# Patient Record
Sex: MALE | Race: WHITE | NOT HISPANIC OR LATINO | Employment: OTHER | URBAN - METROPOLITAN AREA
[De-identification: names, ages, dates, MRNs, and addresses within clinical notes are randomized per-mention and may not be internally consistent; named-entity substitution may affect disease eponyms.]

---

## 2020-08-17 ENCOUNTER — HOSPITAL ENCOUNTER (OUTPATIENT)
Dept: PREADMISSION TESTING | Facility: HOSPITAL | Age: 65
Discharge: HOME | End: 2020-08-17
Attending: COLON & RECTAL SURGERY
Payer: COMMERCIAL

## 2020-08-17 PROCEDURE — U0003 INFECTIOUS AGENT DETECTION BY NUCLEIC ACID (DNA OR RNA); SEVERE ACUTE RESPIRATORY SYNDROME CORONAVIRUS 2 (SARS-COV-2) (CORONAVIRUS DISEASE [COVID-19]), AMPLIFIED PROBE TECHNIQUE, MAKING USE OF HIGH THROUGHPUT TECHNOLOGIES AS DESCRIBED BY CMS-2020-01-R: HCPCS

## 2020-08-17 RX ORDER — TACROLIMUS 1 MG/1
3 CAPSULE ORAL EVERY 12 HOURS
COMMUNITY

## 2020-08-17 RX ORDER — METOPROLOL TARTRATE 50 MG/1
50 TABLET ORAL DAILY
COMMUNITY

## 2020-08-17 RX ORDER — OMEPRAZOLE 20 MG/1
20 CAPSULE, DELAYED RELEASE ORAL DAILY
COMMUNITY

## 2020-08-17 RX ORDER — ALOGLIPTIN 12.5 MG/1
1 TABLET, FILM COATED ORAL DAILY
COMMUNITY

## 2020-08-17 RX ORDER — TAMSULOSIN HYDROCHLORIDE 0.4 MG/1
0.4 CAPSULE ORAL NIGHTLY
COMMUNITY

## 2020-08-17 RX ORDER — INSULIN GLARGINE 100 [IU]/ML
35 INJECTION, SOLUTION SUBCUTANEOUS NIGHTLY
COMMUNITY

## 2020-08-17 RX ORDER — TRIAMCINOLONE ACETONIDE 1 MG/ML
LOTION TOPICAL 2 TIMES DAILY
COMMUNITY

## 2020-08-17 RX ORDER — LEVOTHYROXINE SODIUM 125 UG/1
125 TABLET ORAL DAILY
COMMUNITY

## 2020-08-17 RX ORDER — LOSARTAN POTASSIUM 50 MG/1
50 TABLET ORAL DAILY
COMMUNITY

## 2020-08-17 RX ORDER — TRIAMCINOLONE ACETONIDE 5 MG/G
CREAM TOPICAL 2 TIMES DAILY
COMMUNITY

## 2020-08-17 RX ORDER — IBUPROFEN 200 MG
CAPSULE ORAL
COMMUNITY

## 2020-08-17 RX ORDER — URSODIOL 300 MG/1
300 CAPSULE ORAL 2 TIMES DAILY
COMMUNITY

## 2020-08-17 RX ORDER — CLOTRIMAZOLE 1 G/ML
SOLUTION TOPICAL 2 TIMES DAILY
COMMUNITY

## 2020-08-17 RX ORDER — TACROLIMUS 0.5 MG/1
0.5 CAPSULE ORAL EVERY 12 HOURS
COMMUNITY

## 2020-08-17 RX ORDER — CETIRIZINE HYDROCHLORIDE 10 MG/1
10 TABLET ORAL DAILY
COMMUNITY

## 2020-08-17 RX ORDER — LANOLIN ALCOHOL/MO/W.PET/CERES
420 CREAM (GRAM) TOPICAL DAILY
COMMUNITY

## 2020-08-17 RX ORDER — ERGOCALCIFEROL 1.25 MG/1
50000 CAPSULE ORAL
COMMUNITY

## 2020-08-17 RX ORDER — KETOCONAZOLE 20 MG/G
CREAM TOPICAL DAILY
COMMUNITY

## 2020-08-17 RX ORDER — PRAVASTATIN SODIUM 40 MG/1
20 TABLET ORAL NIGHTLY
COMMUNITY

## 2020-08-17 RX ORDER — KETOCONAZOLE 20 MG/ML
SHAMPOO, SUSPENSION TOPICAL
COMMUNITY

## 2020-08-17 RX ORDER — FLUTICASONE PROPIONATE 50 MCG
2 SPRAY, SUSPENSION (ML) NASAL DAILY
COMMUNITY

## 2020-08-17 NOTE — DISCHARGE INSTRUCTIONS
Colonoscopy Outpatient procedure instructions    Prep Review  Follow instructions as written on Dr. Reveles's Colonoscopy Prep Sheet  Dr. Reveles patients have nothing to eat or drink after morning prep is complete      Take medications as instructed by your doctor.    Wear something comfortable that is easy for you to take off and put on.   Do not wear any makeup, jewelry, or body piercings. Leave valuables at home or let your family member keep them for you. Do not bring them to the Surgery area.     Date/Day of Procedure:     Arrival time: Someone will call you the workday day before the procedure  to give you an arrival time   If asked to report to the hospital before 7:00 am report to the Emergency Room.  If asked to report to the hospital at 7:00 a.m. or later report to Patient Registration  It is not necessary to report earlier than the time you are told.   Ignore any automated/computer generated calls telling to what time to report to the hospital.   Plan to be at the hospital for about 4 hours, however, it could be longer.       Diabetics  If you are diabetic do not take your diabetes medication the morning of the procedure unless otherwise instructed by your doctor.  It is important to monitor your blood sugar levels the day you are doing your prep to make sure your blood sugar levels are maintained.

## 2020-08-18 LAB — SARS-COV-2 RNA RESP QL NAA+PROBE: NOT DETECTED

## 2020-08-18 NOTE — PRE ADMISSION SCREENING
[facility]. Records were faxed to us but nothing stated patient was cleared by MDs. Waiting on medical and cardiac clearance due to pt hx.

## 2020-08-19 ENCOUNTER — ANESTHESIA EVENT (OUTPATIENT)
Dept: ENDOSCOPY | Facility: HOSPITAL | Age: 65
End: 2020-08-19
Payer: OTHER GOVERNMENT

## 2020-08-19 NOTE — PRE ADMISSION SCREENING
Patient was able to see cardiology this week to get cleared for colonoscopy. Patient brought paperwork to hospital today.

## 2020-08-20 ENCOUNTER — HOSPITAL ENCOUNTER (OUTPATIENT)
Facility: HOSPITAL | Age: 65
Discharge: HOME | End: 2020-08-20
Attending: COLON & RECTAL SURGERY | Admitting: COLON & RECTAL SURGERY
Payer: OTHER GOVERNMENT

## 2020-08-20 VITALS
DIASTOLIC BLOOD PRESSURE: 89 MMHG | TEMPERATURE: 97 F | RESPIRATION RATE: 18 BRPM | HEART RATE: 61 BPM | OXYGEN SATURATION: 100 % | SYSTOLIC BLOOD PRESSURE: 166 MMHG

## 2020-08-20 DIAGNOSIS — Z12.11 COLON CANCER SCREENING: Primary | ICD-10-CM

## 2020-08-20 PROCEDURE — 88305 TISSUE EXAM BY PATHOLOGIST: CPT | Mod: 26,,, | Performed by: PATHOLOGY

## 2020-08-20 PROCEDURE — 88305 TISSUE EXAM BY PATHOLOGIST: CPT | Performed by: PATHOLOGY

## 2020-08-20 PROCEDURE — 37000009 HC ANESTHESIA EA ADD 15 MINS: Performed by: COLON & RECTAL SURGERY

## 2020-08-20 PROCEDURE — 25000003 PHARM REV CODE 250: Performed by: NURSE ANESTHETIST, CERTIFIED REGISTERED

## 2020-08-20 PROCEDURE — 27201012 HC FORCEPS, HOT/COLD, DISP: Performed by: COLON & RECTAL SURGERY

## 2020-08-20 PROCEDURE — 37000008 HC ANESTHESIA 1ST 15 MINUTES: Performed by: COLON & RECTAL SURGERY

## 2020-08-20 PROCEDURE — 88305 TISSUE EXAM BY PATHOLOGIST: ICD-10-PCS | Mod: 26,,, | Performed by: PATHOLOGY

## 2020-08-20 PROCEDURE — 63600175 PHARM REV CODE 636 W HCPCS: Performed by: NURSE ANESTHETIST, CERTIFIED REGISTERED

## 2020-08-20 PROCEDURE — 00811 ANES LWR INTST NDSC NOS: CPT | Mod: QZ,33 | Performed by: NURSE ANESTHETIST, CERTIFIED REGISTERED

## 2020-08-20 PROCEDURE — 45380 COLONOSCOPY AND BIOPSY: CPT | Performed by: COLON & RECTAL SURGERY

## 2020-08-20 PROCEDURE — 45380 COLONOSCOPY AND BIOPSY: CPT | Mod: PT,,, | Performed by: COLON & RECTAL SURGERY

## 2020-08-20 PROCEDURE — 45380 PR COLONOSCOPY,BIOPSY: ICD-10-PCS | Mod: PT,,, | Performed by: COLON & RECTAL SURGERY

## 2020-08-20 RX ORDER — ONDANSETRON 2 MG/ML
INJECTION INTRAMUSCULAR; INTRAVENOUS
Status: DISCONTINUED | OUTPATIENT
Start: 2020-08-20 | End: 2020-08-20

## 2020-08-20 RX ORDER — LIDOCAINE HYDROCHLORIDE 20 MG/ML
INJECTION, SOLUTION EPIDURAL; INFILTRATION; INTRACAUDAL; PERINEURAL
Status: DISCONTINUED | OUTPATIENT
Start: 2020-08-20 | End: 2020-08-20

## 2020-08-20 RX ORDER — SODIUM CHLORIDE, SODIUM LACTATE, POTASSIUM CHLORIDE, CALCIUM CHLORIDE 600; 310; 30; 20 MG/100ML; MG/100ML; MG/100ML; MG/100ML
INJECTION, SOLUTION INTRAVENOUS CONTINUOUS PRN
Status: DISCONTINUED | OUTPATIENT
Start: 2020-08-20 | End: 2020-08-20

## 2020-08-20 RX ORDER — PROPOFOL 10 MG/ML
VIAL (ML) INTRAVENOUS
Status: DISCONTINUED | OUTPATIENT
Start: 2020-08-20 | End: 2020-08-20

## 2020-08-20 RX ADMIN — PROPOFOL 50 MG: 10 INJECTION, EMULSION INTRAVENOUS at 10:08

## 2020-08-20 RX ADMIN — LIDOCAINE HYDROCHLORIDE 50 MG: 20 INJECTION, SOLUTION EPIDURAL; INFILTRATION; INTRACAUDAL; PERINEURAL at 10:08

## 2020-08-20 RX ADMIN — SODIUM CHLORIDE, SODIUM LACTATE, POTASSIUM CHLORIDE, AND CALCIUM CHLORIDE: .6; .31; .03; .02 INJECTION, SOLUTION INTRAVENOUS at 10:08

## 2020-08-20 RX ADMIN — ONDANSETRON 4 MG: 2 INJECTION, SOLUTION INTRAMUSCULAR; INTRAVENOUS at 10:08

## 2020-08-20 RX ADMIN — PROPOFOL 150 MG: 10 INJECTION, EMULSION INTRAVENOUS at 10:08

## 2020-08-20 NOTE — ANESTHESIA PREPROCEDURE EVALUATION
08/20/2020  Aaron Virk is a 65 y.o., male.    Anesthesia Evaluation    I have reviewed the Patient Summary Reports.    I have reviewed the Nursing Notes. I have reviewed the NPO Status.   I have reviewed the Medications.     Review of Systems  Anesthesia Hx:  No problems with previous Anesthesia  History of prior surgery of interest to airway management or planning:  Denies Personal Hx of Anesthesia complications.   Social:  Non-Smoker, No Alcohol Use    Hematology/Oncology:  Hematology Normal   Oncology Normal     EENT/Dental:EENT/Dental Normal   Cardiovascular:   Exercise tolerance: good Hypertension, well controlled Past MI  Denies Dysrhythmias.   Denies Angina.        Pulmonary:  Pulmonary Normal    Renal/:   Chronic Renal Disease, CRI    Hepatic/GI:   Liver Disease, Hepatitis Liver tx 3013 w/o issue since   Musculoskeletal:  Musculoskeletal Normal    Neurological:  Neurology Normal    Endocrine:   Diabetes, well controlled, type 2 Hypothyroidism    Dermatological:  Skin Normal    Psych:  Psychiatric Normal           Physical Exam  General:  Well nourished    Airway/Jaw/Neck:  Airway Findings: Mouth Opening: Normal Tongue: Normal  General Airway Assessment: Adult  Mallampati: II  TM Distance: Normal, at least 6 cm  Jaw/Neck Findings:  Neck ROM: Normal ROM      Dental:  Dental Findings: In tact        Mental Status:  Mental Status Findings:  Cooperative, Alert and Oriented         Anesthesia Plan  Type of Anesthesia, risks & benefits discussed:  Anesthesia Type:  general  Patient's Preference:   Intra-op Monitoring Plan: standard ASA monitors  Intra-op Monitoring Plan Comments:   Post Op Pain Control Plan: multimodal analgesia  Post Op Pain Control Plan Comments:   Induction:   IV  Beta Blocker:  Patient is on a Beta-Blocker and has received one dose within the past 24 hours (No further  documentation required).       Informed Consent: Patient understands risks and agrees with Anesthesia plan.  Questions answered. Anesthesia consent signed with patient.  ASA Score: 3     Day of Surgery Review of History & Physical: I have interviewed and examined the patient. I have reviewed the patient's H&P dated: 8/20/20. There are no significant changes.  H&P update referred to the surgeon.     Anesthesia Plan Notes: Medical and cardiac clearance [facility], low risk, ok to proceed.    Pt c/o severe headache after propofol administration with prior procedures         Ready For Surgery From Anesthesia Perspective.

## 2020-08-20 NOTE — DISCHARGE SUMMARY
Discharge Note  Short Stay      SUMMARY     Admit Date: 8/20/2020    Attending Physician: Larry Reveles MD     Discharge Physician: Larry Reveles MD    Discharge Date: 8/20/2020 11:07 AM    Admission Diagnosis: asymptomatic screening exam    Final Diagnosis:  Colon polyp, diverticulosis    Procedures Performed:    Colonoscopy polypectomy cold forceps    Disposition: Home or Self Care    Condition at Discharge:  Stable    Patient Instructions:   Current Discharge Medication List      CONTINUE these medications which have NOT CHANGED    Details   alogliptin (NESINA) 12.5 mg Tab Take 1 tablet by mouth once daily.      blood sugar diagnostic (BLOOD GLUCOSE TEST) Strp by Misc.(Non-Drug; Combo Route) route.      cetirizine (ZYRTEC) 10 MG tablet Take 10 mg by mouth once daily.      clotrimazole (LOTRIMIN) 1 % Soln Apply topically 2 (two) times daily.      ergocalciferol (VITAMIN D2) 50,000 unit Cap Take 50,000 Units by mouth every 30 days.      fluticasone propionate (FLONASE) 50 mcg/actuation nasal spray 2 sprays by Each Nostril route once daily.      insulin glargine (LANTUS) 100 unit/mL injection Inject 35 Units into the skin every evening.      ketoconazole (NIZORAL) 2 % cream Apply topically once daily.      ketoconazole (NIZORAL) 2 % shampoo Apply topically 3 (three) times a week. Lather on to skin, let it sit for 5 min before rinsing off      levothyroxine (SYNTHROID) 125 MCG tablet Take 125 mcg by mouth once daily.      losartan (COZAAR) 50 MG tablet Take 50 mg by mouth once daily.    Comments: .      magnesium oxide (MAG-OX) 400 mg (241.3 mg magnesium) tablet Take 420 mg by mouth once daily.      metoprolol tartrate (LOPRESSOR) 50 MG tablet Take 50 mg by mouth once daily.    Comments: .      omeprazole (PRILOSEC) 20 MG capsule Take 20 mg by mouth once daily. esdras on an empty stomach      pravastatin (PRAVACHOL) 40 MG tablet Take 20 mg by mouth every evening.      !! tacrolimus (PROGRAF) 0.5 MG Cap Take 0.5 mg by  mouth every 12 (twelve) hours.      !! tacrolimus (PROGRAF) 1 MG Cap Take 3 mg by mouth every 12 (twelve) hours.      tamsulosin (FLOMAX) 0.4 mg Cap Take 0.4 mg by mouth every evening.      ursodioL (ACTIGALL) 300 mg capsule Take 300 mg by mouth 2 (two) times daily.      ACCU-CHEK SOFTCLIX LANCETS MISC by Misc.(Non-Drug; Combo Route) route.      triamcinolone acetonide 0.1% (KENALOG) 0.1 % Lotn Apply topically 2 (two) times daily.      triamcinolone acetonide 0.5% (KENALOG) 0.5 % Crea Apply topically 2 (two) times daily.       !! - Potential duplicate medications found. Please discuss with provider.      STOP taking these medications       bisacodyL (DULCOLAX) 5 mg EC tablet Comments:   Reason for Stopping:         sodium,potassium,mag sulfates (SUPREP BOWEL PREP KIT) 17.5-3.13-1.6 gram SolR Comments:   Reason for Stopping:               Discharge Procedure Orders (must include Diet, Follow-up, Activity)   Discharge Procedure Orders (must include Diet, Follow-up, Activity)   Diet Adult Regular     Activity as tolerated        Repeat colonoscopy 5 years.  High fiber diet

## 2020-08-20 NOTE — OP NOTE
Patient Name: Aaron Virk   Procedure Date: 2020  MRN: [id number]  : 1955  Age: 65 y.o.  Gender: male   Referring Physician :  [facility]  Plan for Procedure: Monitored anaesthesia care  Indication: asymptomatic screening exam  Procedure:   Colonoscopy polypectomy cold forceps    Surgeon(s) and Role:     * Larry Reveles MD - Primary    Complications: None     Medicines: monitored anesthesia care    Procedure:  Prior to the procedure, a History and Physical was performed, and patient medications, allergies and sensitivities were reviewed.  The patient's tolerance of previous anesthesia was reviewed. The risks and benefits of the procedure and the sedation options and risks were discussed with the patient.  All questions were answered and informed consent was obtained.    After I obtained informed consent, the scope was passed under direct vision.  Throughout the procedure, the patient's blood pressure, pulse, and oxygen saturations were monitored continuously.  The Olympus scope CF - HW875G was introduced through the anus and advanced to the cecum, identified by appendiceal orifice and ileocecal valve.  The colonoscopy was performed without difficulty.  The patient tolerated the procedure well.  The quality of the bowel preparation was good     Findings:  diverticulosis,  Mild in degree, involving the descending colon and the sigmoid  polyp(s) #1, 3 mm in size, located in the descending colon removed by cold biopsy and sent for pathology    EBL: none    Impression:  A single benign appearing colon polyp, removed as above.  Mild colonic diverticulosis involving  the descending colon and the sigmoid    Recommendation:  Repeat exam: 5 years  Return to my office prn  High fiber diet

## 2020-08-20 NOTE — ANESTHESIA POSTPROCEDURE EVALUATION
Anesthesia Post Evaluation    Patient: Aaron Virk    Procedure(s) Performed: Procedure(s) (LRB):  COLONOSCOPY (N/A)    Final Anesthesia Type: general    Patient location during evaluation: PACU  Patient participation: Yes- Able to Participate  Level of consciousness: awake and alert and oriented  Post-procedure vital signs: reviewed and stable  Pain management: adequate  Airway patency: patent    PONV status at discharge: No PONV  Anesthetic complications: no      Cardiovascular status: blood pressure returned to baseline and hemodynamically stable  Respiratory status: unassisted, spontaneous ventilation and room air  Hydration status: euvolemic  Follow-up not needed.          Vitals Value Taken Time   /89 08/20/20 1119   Temp  08/20/20 1224   Pulse 61 08/20/20 1119   Resp 18 08/20/20 1119   SpO2 100 % 08/20/20 1119         Event Time   Out of Recovery 11:33:02         Pain/Saul Score: Saul Score: 10 (8/20/2020 11:23 AM)

## 2020-08-20 NOTE — TRANSFER OF CARE
Anesthesia Transfer of Care Note    Patient: Aaron Virk    Procedure(s) Performed: Procedure(s) (LRB):  COLONOSCOPY (N/A)    Patient location: PACU    Anesthesia Type: general    Transport from OR: Transported from OR on 6-10 L/min O2 by face mask with adequate spontaneous ventilation    Post pain: adequate analgesia    Post assessment: no apparent anesthetic complications and tolerated procedure well    Post vital signs: stable    Level of consciousness: sedated    Nausea/Vomiting: no nausea/vomiting    Complications: none    Transfer of care protocol was followed      Last vitals:   Visit Vitals  BP (!) 147/82 (BP Location: Right arm, Patient Position: Lying)   Pulse 72   Temp 36.2 °C (97.2 °F) (Oral)   Resp 16   SpO2 98%

## 2020-08-20 NOTE — H&P
Procedure : Colonoscopy    Indication(s):  asymptomatic screening exam    Review of patient's allergies indicates:   Allergen Reactions    Atorvastatin     Codeine     Morphine     Asa [aspirin] Other (See Comments)     Severe abdominal pain       Past Medical History:   Diagnosis Date    Anemia     Chronic kidney disease, stage 3 (moderate)     Chronic rhinitis     Chronic viral hepatitis C     Cirrhosis of liver     Diabetes mellitus     Disorder of magnesium metabolism     Disorder of phosphorus metabolism     Esophageal varices without bleeding     Hemorrhoids     Hereditary hemochromatosis     Hypertension     Hypothyroidism     Impacted cerumen, bilateral     Liver transplant recipient     Murmur, cardiac     Myocardial infarction     Neoplasm of uncertain behavior of liver     NSTEMI (non-ST elevated myocardial infarction)     Plantar fibromatosis     Psoriasis     Psoriatic arthritis mutilans     Psoriatic arthropathy     Thoracic aortic ectasia     Thrombocytopenia     Tinea cruris     Tinnitus of both ears     Traumatic arthropathy, unspecified site        Prior to Admission medications    Medication Sig Start Date End Date Taking? Authorizing Provider   alogliptin (NESINA) 12.5 mg Tab Take 1 tablet by mouth once daily.   Yes Historical Provider, MD   blood sugar diagnostic (BLOOD GLUCOSE TEST) Strp by Misc.(Non-Drug; Combo Route) route.   Yes Historical Provider, MD   cetirizine (ZYRTEC) 10 MG tablet Take 10 mg by mouth once daily.   Yes Historical Provider, MD   clotrimazole (LOTRIMIN) 1 % Soln Apply topically 2 (two) times daily.   Yes Historical Provider, MD   ergocalciferol (VITAMIN D2) 50,000 unit Cap Take 50,000 Units by mouth every 30 days.   Yes Historical Provider, MD   fluticasone propionate (FLONASE) 50 mcg/actuation nasal spray 2 sprays by Each Nostril route once daily.   Yes Historical Provider, MD   insulin glargine (LANTUS) 100 unit/mL injection Inject 35  Units into the skin every evening.   Yes Historical Provider, MD   ketoconazole (NIZORAL) 2 % cream Apply topically once daily.   Yes Historical Provider, MD   ketoconazole (NIZORAL) 2 % shampoo Apply topically 3 (three) times a week. Lather on to skin, let it sit for 5 min before rinsing off   Yes Historical Provider, MD   levothyroxine (SYNTHROID) 125 MCG tablet Take 125 mcg by mouth once daily.   Yes Historical Provider, MD   losartan (COZAAR) 50 MG tablet Take 50 mg by mouth once daily.   Yes Historical Provider, MD   magnesium oxide (MAG-OX) 400 mg (241.3 mg magnesium) tablet Take 420 mg by mouth once daily.   Yes Historical Provider, MD   metoprolol tartrate (LOPRESSOR) 50 MG tablet Take 50 mg by mouth once daily.   Yes Historical Provider, MD   omeprazole (PRILOSEC) 20 MG capsule Take 20 mg by mouth once daily. esdras on an empty stomach   Yes Historical Provider, MD   pravastatin (PRAVACHOL) 40 MG tablet Take 20 mg by mouth every evening.   Yes Historical Provider, MD   tacrolimus (PROGRAF) 0.5 MG Cap Take 0.5 mg by mouth every 12 (twelve) hours.   Yes Historical Provider, MD   tacrolimus (PROGRAF) 1 MG Cap Take 3 mg by mouth every 12 (twelve) hours.   Yes Historical Provider, MD   tamsulosin (FLOMAX) 0.4 mg Cap Take 0.4 mg by mouth every evening.   Yes Historical Provider, MD   ursodioL (ACTIGALL) 300 mg capsule Take 300 mg by mouth 2 (two) times daily.   Yes Historical Provider, MD   ACCU-CHEK SOFTCLIX LANCETS MISC by Misc.(Non-Drug; Combo Route) route.    Historical Provider, MD   bisacodyL (DULCOLAX) 5 mg EC tablet Take 4 tablets (20 mg total) by mouth once. Take Wednesday at noon for 1 dose 8/19/20 8/19/20  Larry Reveles MD   sodium,potassium,mag sulfates (SUPREP BOWEL PREP KIT) 17.5-3.13-1.6 gram SolR Take 354 mLs by mouth once daily for 1 day 8/19/20 8/20/20  Larry Reveles MD   triamcinolone acetonide 0.1% (KENALOG) 0.1 % Lotn Apply topically 2 (two) times daily.    Historical Provider, MD    triamcinolone acetonide 0.5% (KENALOG) 0.5 % Crea Apply topically 2 (two) times daily.    Historical Provider, MD       Sedation Problems: NO    History reviewed. No pertinent family history.    Fam Hx of Sedation Problems: NO    Social History     Socioeconomic History    Marital status:      Spouse name: Not on file    Number of children: Not on file    Years of education: Not on file    Highest education level: Not on file   Occupational History    Not on file   Social Needs    Financial resource strain: Not on file    Food insecurity     Worry: Not on file     Inability: Not on file    Transportation needs     Medical: Not on file     Non-medical: Not on file   Tobacco Use    Smoking status: Never Smoker   Substance and Sexual Activity    Alcohol use: Not Currently    Drug use: Never    Sexual activity: Not on file   Lifestyle    Physical activity     Days per week: Not on file     Minutes per session: Not on file    Stress: Not on file   Relationships    Social connections     Talks on phone: Not on file     Gets together: Not on file     Attends Voodoo service: Not on file     Active member of club or organization: Not on file     Attends meetings of clubs or organizations: Not on file     Relationship status: Not on file   Other Topics Concern    Not on file   Social History Narrative    Not on file       Review of Systems -     Respiratory ROS: no cough, shortness of breath, or wheezing  Cardiovascular ROS: no chest pain or dyspnea on exertion  Gastrointestinal ROS: no abdominal pain, change in bowel habits, or black or bloody stools  Musculoskeletal ROS: negative  Neurological ROS: no TIA or stroke symptoms        Physical Exam:  General: no distress  Head: normocephalic  Airway:  normal oropharynx, airway normal  Neck: supple, symmetrical, trachea midline  Lungs:  normal respiratory effort  Heart: regular rate and rhythm  Abdomen: soft, non-tender non-distented; bowel sounds  normal; no masses,  no organomegaly  Extremities: no cyanosis or edema, or clubbing       Deep Sedation: Mallampati Score per anesthesia     SedationPlan :Moderate     ASA : III    Patient is medically cleared for anesthesia.    Anesthesia/Surgery risks, benefits and alternative options discussed and understood by patient/family.

## 2020-08-24 LAB
FINAL PATHOLOGIC DIAGNOSIS: NORMAL
GROSS: NORMAL

## 2020-09-03 ENCOUNTER — ANESTHESIA (OUTPATIENT)
Dept: ENDOSCOPY | Facility: HOSPITAL | Age: 65
End: 2020-09-03
Payer: COMMERCIAL

## 2021-01-01 ENCOUNTER — HOSPITAL ENCOUNTER (INPATIENT)
Facility: HOSPITAL | Age: 66
LOS: 24 days | DRG: 207 | End: 2021-10-12
Attending: EMERGENCY MEDICINE | Admitting: EMERGENCY MEDICINE
Payer: OTHER GOVERNMENT

## 2021-01-01 ENCOUNTER — HOSPITAL ENCOUNTER (EMERGENCY)
Facility: HOSPITAL | Age: 66
Discharge: ANOTHER HOSPITAL | End: 2021-09-18
Attending: SURGERY
Payer: OTHER GOVERNMENT

## 2021-01-01 VITALS
SYSTOLIC BLOOD PRESSURE: 147 MMHG | OXYGEN SATURATION: 97 % | WEIGHT: 199 LBS | DIASTOLIC BLOOD PRESSURE: 73 MMHG | TEMPERATURE: 99 F | HEART RATE: 79 BPM | RESPIRATION RATE: 20 BRPM

## 2021-01-01 VITALS
HEART RATE: 92 BPM | DIASTOLIC BLOOD PRESSURE: 56 MMHG | HEIGHT: 72 IN | TEMPERATURE: 97 F | OXYGEN SATURATION: 56 % | SYSTOLIC BLOOD PRESSURE: 119 MMHG | RESPIRATION RATE: 36 BRPM | WEIGHT: 224.44 LBS | BODY MASS INDEX: 30.4 KG/M2

## 2021-01-01 DIAGNOSIS — E87.1 HYPONATREMIA: ICD-10-CM

## 2021-01-01 DIAGNOSIS — J12.82 PNEUMONIA DUE TO COVID-19 VIRUS: ICD-10-CM

## 2021-01-01 DIAGNOSIS — N17.9 AKI (ACUTE KIDNEY INJURY): ICD-10-CM

## 2021-01-01 DIAGNOSIS — U07.1 PNEUMONIA DUE TO COVID-19 VIRUS: ICD-10-CM

## 2021-01-01 DIAGNOSIS — E87.5 HYPERKALEMIA: ICD-10-CM

## 2021-01-01 DIAGNOSIS — U07.1 COVID-19 VIRUS INFECTION: ICD-10-CM

## 2021-01-01 DIAGNOSIS — R09.02 HYPOXIA: Primary | ICD-10-CM

## 2021-01-01 DIAGNOSIS — I10 ESSENTIAL HYPERTENSION: ICD-10-CM

## 2021-01-01 DIAGNOSIS — U07.1 COVID-19: ICD-10-CM

## 2021-01-01 DIAGNOSIS — R00.0 TACHYCARDIA: ICD-10-CM

## 2021-01-01 LAB
ABO + RH BLD: NORMAL
ALBUMIN SERPL BCP-MCNC: 1.2 G/DL (ref 3.5–5.2)
ALBUMIN SERPL BCP-MCNC: 1.3 G/DL (ref 3.5–5.2)
ALBUMIN SERPL BCP-MCNC: 1.4 G/DL (ref 3.5–5.2)
ALBUMIN SERPL BCP-MCNC: 1.5 G/DL (ref 3.5–5.2)
ALBUMIN SERPL BCP-MCNC: 1.6 G/DL (ref 3.5–5.2)
ALBUMIN SERPL BCP-MCNC: 1.7 G/DL (ref 3.5–5.2)
ALBUMIN SERPL BCP-MCNC: 1.8 G/DL (ref 3.5–5.2)
ALBUMIN SERPL BCP-MCNC: 1.9 G/DL (ref 3.5–5.2)
ALBUMIN SERPL BCP-MCNC: 2 G/DL (ref 3.5–5.2)
ALBUMIN SERPL BCP-MCNC: 2.2 G/DL (ref 3.5–5.2)
ALBUMIN SERPL BCP-MCNC: 2.2 G/DL (ref 3.5–5.2)
ALBUMIN SERPL BCP-MCNC: 2.3 G/DL (ref 3.5–5.2)
ALBUMIN SERPL BCP-MCNC: 2.4 G/DL (ref 3.5–5.2)
ALBUMIN SERPL BCP-MCNC: 2.5 G/DL (ref 3.5–5.2)
ALBUMIN SERPL BCP-MCNC: 2.9 G/DL (ref 3.5–5.2)
ALLENS TEST: ABNORMAL
ALP SERPL-CCNC: 101 U/L (ref 55–135)
ALP SERPL-CCNC: 103 U/L (ref 55–135)
ALP SERPL-CCNC: 104 U/L (ref 55–135)
ALP SERPL-CCNC: 106 U/L (ref 55–135)
ALP SERPL-CCNC: 108 U/L (ref 55–135)
ALP SERPL-CCNC: 109 U/L (ref 55–135)
ALP SERPL-CCNC: 111 U/L (ref 55–135)
ALP SERPL-CCNC: 114 U/L (ref 55–135)
ALP SERPL-CCNC: 118 U/L (ref 55–135)
ALP SERPL-CCNC: 125 U/L (ref 55–135)
ALP SERPL-CCNC: 130 U/L (ref 55–135)
ALP SERPL-CCNC: 138 U/L (ref 55–135)
ALP SERPL-CCNC: 140 U/L (ref 55–135)
ALP SERPL-CCNC: 47 U/L (ref 55–135)
ALP SERPL-CCNC: 48 U/L (ref 55–135)
ALP SERPL-CCNC: 50 U/L (ref 55–135)
ALP SERPL-CCNC: 53 U/L (ref 55–135)
ALP SERPL-CCNC: 59 U/L (ref 55–135)
ALP SERPL-CCNC: 61 U/L (ref 55–135)
ALP SERPL-CCNC: 66 U/L (ref 55–135)
ALP SERPL-CCNC: 66 U/L (ref 55–135)
ALP SERPL-CCNC: 67 U/L (ref 55–135)
ALP SERPL-CCNC: 73 U/L (ref 55–135)
ALP SERPL-CCNC: 80 U/L (ref 55–135)
ALP SERPL-CCNC: 91 U/L (ref 55–135)
ALP SERPL-CCNC: 92 U/L (ref 55–135)
ALT SERPL W/O P-5'-P-CCNC: 14 U/L (ref 10–44)
ALT SERPL W/O P-5'-P-CCNC: 17 U/L (ref 10–44)
ALT SERPL W/O P-5'-P-CCNC: 18 U/L (ref 10–44)
ALT SERPL W/O P-5'-P-CCNC: 19 U/L (ref 10–44)
ALT SERPL W/O P-5'-P-CCNC: 20 U/L (ref 10–44)
ALT SERPL W/O P-5'-P-CCNC: 20 U/L (ref 10–44)
ALT SERPL W/O P-5'-P-CCNC: 21 U/L (ref 10–44)
ALT SERPL W/O P-5'-P-CCNC: 23 U/L (ref 10–44)
ALT SERPL W/O P-5'-P-CCNC: 24 U/L (ref 10–44)
ALT SERPL W/O P-5'-P-CCNC: 24 U/L (ref 10–44)
ALT SERPL W/O P-5'-P-CCNC: 25 U/L (ref 10–44)
ALT SERPL W/O P-5'-P-CCNC: 26 U/L (ref 10–44)
ALT SERPL W/O P-5'-P-CCNC: 26 U/L (ref 10–44)
ALT SERPL W/O P-5'-P-CCNC: 28 U/L (ref 10–44)
ALT SERPL W/O P-5'-P-CCNC: 30 U/L (ref 10–44)
ALT SERPL W/O P-5'-P-CCNC: 30 U/L (ref 10–44)
ALT SERPL W/O P-5'-P-CCNC: 31 U/L (ref 10–44)
ALT SERPL W/O P-5'-P-CCNC: 36 U/L (ref 10–44)
AMMONIA PLAS-SCNC: 42 UMOL/L (ref 10–50)
AMORPH CRY UR QL COMP ASSIST: ABNORMAL
ANION GAP SERPL CALC-SCNC: 10 MMOL/L (ref 8–16)
ANION GAP SERPL CALC-SCNC: 11 MMOL/L (ref 8–16)
ANION GAP SERPL CALC-SCNC: 12 MMOL/L (ref 8–16)
ANION GAP SERPL CALC-SCNC: 13 MMOL/L (ref 8–16)
ANION GAP SERPL CALC-SCNC: 14 MMOL/L (ref 8–16)
ANION GAP SERPL CALC-SCNC: 15 MMOL/L (ref 8–16)
ANION GAP SERPL CALC-SCNC: 16 MMOL/L (ref 8–16)
ANION GAP SERPL CALC-SCNC: 16 MMOL/L (ref 8–16)
ANION GAP SERPL CALC-SCNC: 17 MMOL/L (ref 8–16)
ANION GAP SERPL CALC-SCNC: 18 MMOL/L (ref 8–16)
ANION GAP SERPL CALC-SCNC: 19 MMOL/L (ref 8–16)
ANION GAP SERPL CALC-SCNC: 19 MMOL/L (ref 8–16)
ANION GAP SERPL CALC-SCNC: 21 MMOL/L (ref 8–16)
ANION GAP SERPL CALC-SCNC: 6 MMOL/L (ref 8–16)
ANION GAP SERPL CALC-SCNC: 7 MMOL/L (ref 8–16)
ANION GAP SERPL CALC-SCNC: 7 MMOL/L (ref 8–16)
ANION GAP SERPL CALC-SCNC: 8 MMOL/L (ref 8–16)
ANION GAP SERPL CALC-SCNC: 9 MMOL/L (ref 8–16)
ANISOCYTOSIS BLD QL SMEAR: SLIGHT
APTT BLDCRRT: 26.2 SEC (ref 21–32)
APTT BLDCRRT: 26.4 SEC (ref 21–32)
APTT BLDCRRT: 30.5 SEC (ref 21–32)
APTT BLDCRRT: 30.8 SEC (ref 21–32)
APTT BLDCRRT: 33.3 SEC (ref 21–32)
APTT BLDCRRT: 34.8 SEC (ref 21–32)
APTT BLDCRRT: 35.7 SEC (ref 21–32)
APTT BLDCRRT: 35.9 SEC (ref 21–32)
APTT BLDCRRT: 37.5 SEC (ref 21–32)
APTT BLDCRRT: 37.8 SEC (ref 21–32)
APTT BLDCRRT: 38.1 SEC (ref 21–32)
APTT BLDCRRT: 39.3 SEC (ref 21–32)
APTT BLDCRRT: 40.4 SEC (ref 21–32)
APTT BLDCRRT: 41.4 SEC (ref 21–32)
APTT BLDCRRT: 41.5 SEC (ref 21–32)
APTT BLDCRRT: 41.6 SEC (ref 21–32)
APTT BLDCRRT: 42.2 SEC (ref 21–32)
APTT BLDCRRT: 42.7 SEC (ref 21–32)
APTT BLDCRRT: 43.2 SEC (ref 21–32)
APTT BLDCRRT: 46 SEC (ref 21–32)
APTT BLDCRRT: 46.4 SEC (ref 21–32)
APTT BLDCRRT: 47.8 SEC (ref 21–32)
ASCENDING AORTA: 3.81 CM
AST SERPL-CCNC: 14 U/L (ref 10–40)
AST SERPL-CCNC: 16 U/L (ref 10–40)
AST SERPL-CCNC: 17 U/L (ref 10–40)
AST SERPL-CCNC: 19 U/L (ref 10–40)
AST SERPL-CCNC: 21 U/L (ref 10–40)
AST SERPL-CCNC: 23 U/L (ref 10–40)
AST SERPL-CCNC: 24 U/L (ref 10–40)
AST SERPL-CCNC: 25 U/L (ref 10–40)
AST SERPL-CCNC: 26 U/L (ref 10–40)
AST SERPL-CCNC: 27 U/L (ref 10–40)
AST SERPL-CCNC: 28 U/L (ref 10–40)
AST SERPL-CCNC: 29 U/L (ref 10–40)
AST SERPL-CCNC: 30 U/L (ref 10–40)
AST SERPL-CCNC: 32 U/L (ref 10–40)
AST SERPL-CCNC: 38 U/L (ref 10–40)
AST SERPL-CCNC: 38 U/L (ref 10–40)
AST SERPL-CCNC: 39 U/L (ref 10–40)
AST SERPL-CCNC: 45 U/L (ref 10–40)
AST SERPL-CCNC: 46 U/L (ref 10–40)
AST SERPL-CCNC: 49 U/L (ref 10–40)
AST SERPL-CCNC: 57 U/L (ref 10–40)
AST SERPL-CCNC: 71 U/L (ref 10–40)
AV INDEX (PROSTH): 1.08
AV MEAN GRADIENT: 3 MMHG
AV PEAK GRADIENT: 5 MMHG
AV VALVE AREA: 6.2 CM2
AV VELOCITY RATIO: 0.98
B-OH-BUTYR BLD STRIP-SCNC: 2.1 MMOL/L (ref 0–0.5)
BACTERIA #/AREA URNS AUTO: ABNORMAL /HPF
BACTERIA BLD CULT: NORMAL
BACTERIA SPEC AEROBE CULT: NORMAL
BACTERIA SPEC AEROBE CULT: NORMAL
BASO STIPL BLD QL SMEAR: ABNORMAL
BASOPHILS # BLD AUTO: 0 K/UL (ref 0–0.2)
BASOPHILS # BLD AUTO: 0.01 K/UL (ref 0–0.2)
BASOPHILS # BLD AUTO: 0.02 K/UL (ref 0–0.2)
BASOPHILS # BLD AUTO: 0.06 K/UL (ref 0–0.2)
BASOPHILS # BLD AUTO: ABNORMAL K/UL (ref 0–0.2)
BASOPHILS NFR BLD: 0 % (ref 0–1.9)
BASOPHILS NFR BLD: 0.1 % (ref 0–1.9)
BASOPHILS NFR BLD: 0.2 % (ref 0–1.9)
BASOPHILS NFR BLD: 0.3 % (ref 0–1.9)
BASOPHILS NFR BLD: 0.5 % (ref 0–1.9)
BILIRUB SERPL-MCNC: 0.3 MG/DL (ref 0.1–1)
BILIRUB SERPL-MCNC: 0.3 MG/DL (ref 0.1–1)
BILIRUB SERPL-MCNC: 0.4 MG/DL (ref 0.1–1)
BILIRUB SERPL-MCNC: 0.5 MG/DL (ref 0.1–1)
BILIRUB SERPL-MCNC: 0.6 MG/DL (ref 0.1–1)
BILIRUB SERPL-MCNC: 0.7 MG/DL (ref 0.1–1)
BILIRUB SERPL-MCNC: 0.8 MG/DL (ref 0.1–1)
BILIRUB SERPL-MCNC: 1 MG/DL (ref 0.1–1)
BILIRUB SERPL-MCNC: 1 MG/DL (ref 0.1–1)
BILIRUB SERPL-MCNC: 1.1 MG/DL (ref 0.1–1)
BILIRUB SERPL-MCNC: 1.1 MG/DL (ref 0.1–1)
BILIRUB SERPL-MCNC: 1.3 MG/DL (ref 0.1–1)
BILIRUB SERPL-MCNC: 1.3 MG/DL (ref 0.1–1)
BILIRUB UR QL STRIP: NEGATIVE
BLD GP AB SCN CELLS X3 SERPL QL: NORMAL
BLD PROD TYP BPU: NORMAL
BLOOD UNIT EXPIRATION DATE: NORMAL
BLOOD UNIT TYPE CODE: 6200
BLOOD UNIT TYPE: NORMAL
BNP SERPL-MCNC: 175 PG/ML (ref 0–99)
BNP SERPL-MCNC: 209 PG/ML (ref 0–99)
BNP SERPL-MCNC: 390 PG/ML (ref 0–99)
BSA FOR ECHO PROCEDURE: 2.2 M2
BUN SERPL-MCNC: 4 MG/DL (ref 8–23)
BUN SERPL-MCNC: 41 MG/DL (ref 8–23)
BUN SERPL-MCNC: 41 MG/DL (ref 8–23)
BUN SERPL-MCNC: 45 MG/DL (ref 8–23)
BUN SERPL-MCNC: 47 MG/DL (ref 8–23)
BUN SERPL-MCNC: 48 MG/DL (ref 8–23)
BUN SERPL-MCNC: 49 MG/DL (ref 8–23)
BUN SERPL-MCNC: 5 MG/DL (ref 8–23)
BUN SERPL-MCNC: 50 MG/DL (ref 8–23)
BUN SERPL-MCNC: 57 MG/DL (ref 8–23)
BUN SERPL-MCNC: 58 MG/DL (ref 8–23)
BUN SERPL-MCNC: 58 MG/DL (ref 8–23)
BUN SERPL-MCNC: 59 MG/DL (ref 8–23)
BUN SERPL-MCNC: 6 MG/DL (ref 8–23)
BUN SERPL-MCNC: 60 MG/DL (ref 8–23)
BUN SERPL-MCNC: 61 MG/DL (ref 8–23)
BUN SERPL-MCNC: 62 MG/DL (ref 8–23)
BUN SERPL-MCNC: 63 MG/DL (ref 8–23)
BUN SERPL-MCNC: 66 MG/DL (ref 8–23)
BUN SERPL-MCNC: 68 MG/DL (ref 8–23)
BUN SERPL-MCNC: 7 MG/DL (ref 8–23)
BUN SERPL-MCNC: 70 MG/DL (ref 8–23)
BUN SERPL-MCNC: 72 MG/DL (ref 8–23)
BUN SERPL-MCNC: 73 MG/DL (ref 8–23)
BUN SERPL-MCNC: 73 MG/DL (ref 8–23)
BUN SERPL-MCNC: 74 MG/DL (ref 8–23)
BUN SERPL-MCNC: 75 MG/DL (ref 8–23)
BUN SERPL-MCNC: 76 MG/DL (ref 8–23)
BUN SERPL-MCNC: 76 MG/DL (ref 8–23)
BUN SERPL-MCNC: 77 MG/DL (ref 8–23)
BUN SERPL-MCNC: 78 MG/DL (ref 8–23)
BUN SERPL-MCNC: 79 MG/DL (ref 8–23)
BUN SERPL-MCNC: 8 MG/DL (ref 8–23)
BUN SERPL-MCNC: 8 MG/DL (ref 8–23)
BUN SERPL-MCNC: 80 MG/DL (ref 8–23)
BUN SERPL-MCNC: 80 MG/DL (ref 8–23)
BUN SERPL-MCNC: 82 MG/DL (ref 8–23)
BUN SERPL-MCNC: 82 MG/DL (ref 8–23)
BUN SERPL-MCNC: 84 MG/DL (ref 8–23)
BUN SERPL-MCNC: 85 MG/DL (ref 8–23)
BUN SERPL-MCNC: 85 MG/DL (ref 8–23)
BUN SERPL-MCNC: 86 MG/DL (ref 8–23)
BUN SERPL-MCNC: 9 MG/DL (ref 8–23)
BUN SERPL-MCNC: 93 MG/DL (ref 8–23)
BUN SERPL-MCNC: 94 MG/DL (ref 8–23)
BUN SERPL-MCNC: 96 MG/DL (ref 8–23)
BUN SERPL-MCNC: 98 MG/DL (ref 8–23)
BUN SERPL-MCNC: 99 MG/DL (ref 8–23)
BURR CELLS BLD QL SMEAR: ABNORMAL
CALCIUM SERPL-MCNC: 7.4 MG/DL (ref 8.7–10.5)
CALCIUM SERPL-MCNC: 7.5 MG/DL (ref 8.7–10.5)
CALCIUM SERPL-MCNC: 7.5 MG/DL (ref 8.7–10.5)
CALCIUM SERPL-MCNC: 7.6 MG/DL (ref 8.7–10.5)
CALCIUM SERPL-MCNC: 7.7 MG/DL (ref 8.7–10.5)
CALCIUM SERPL-MCNC: 7.8 MG/DL (ref 8.7–10.5)
CALCIUM SERPL-MCNC: 7.9 MG/DL (ref 8.7–10.5)
CALCIUM SERPL-MCNC: 8 MG/DL (ref 8.7–10.5)
CALCIUM SERPL-MCNC: 8.1 MG/DL (ref 8.7–10.5)
CALCIUM SERPL-MCNC: 8.2 MG/DL (ref 8.7–10.5)
CALCIUM SERPL-MCNC: 8.3 MG/DL (ref 8.7–10.5)
CALCIUM SERPL-MCNC: 8.5 MG/DL (ref 8.7–10.5)
CALCIUM SERPL-MCNC: 8.6 MG/DL (ref 8.7–10.5)
CALCIUM SERPL-MCNC: 8.7 MG/DL (ref 8.7–10.5)
CALCIUM SERPL-MCNC: 8.7 MG/DL (ref 8.7–10.5)
CALCIUM SERPL-MCNC: 8.8 MG/DL (ref 8.7–10.5)
CHLORIDE SERPL-SCNC: 100 MMOL/L (ref 95–110)
CHLORIDE SERPL-SCNC: 101 MMOL/L (ref 95–110)
CHLORIDE SERPL-SCNC: 102 MMOL/L (ref 95–110)
CHLORIDE SERPL-SCNC: 103 MMOL/L (ref 95–110)
CHLORIDE SERPL-SCNC: 104 MMOL/L (ref 95–110)
CHLORIDE SERPL-SCNC: 105 MMOL/L (ref 95–110)
CHLORIDE SERPL-SCNC: 106 MMOL/L (ref 95–110)
CHLORIDE SERPL-SCNC: 107 MMOL/L (ref 95–110)
CHLORIDE SERPL-SCNC: 108 MMOL/L (ref 95–110)
CHLORIDE SERPL-SCNC: 109 MMOL/L (ref 95–110)
CHLORIDE SERPL-SCNC: 109 MMOL/L (ref 95–110)
CHLORIDE SERPL-SCNC: 110 MMOL/L (ref 95–110)
CHLORIDE SERPL-SCNC: 111 MMOL/L (ref 95–110)
CHLORIDE SERPL-SCNC: 112 MMOL/L (ref 95–110)
CHLORIDE SERPL-SCNC: 112 MMOL/L (ref 95–110)
CHLORIDE SERPL-SCNC: 115 MMOL/L (ref 95–110)
CHLORIDE SERPL-SCNC: 115 MMOL/L (ref 95–110)
CHLORIDE SERPL-SCNC: 77 MMOL/L (ref 95–110)
CHLORIDE SERPL-SCNC: 78 MMOL/L (ref 95–110)
CHLORIDE SERPL-SCNC: 78 MMOL/L (ref 95–110)
CHLORIDE SERPL-SCNC: 79 MMOL/L (ref 95–110)
CHLORIDE SERPL-SCNC: 82 MMOL/L (ref 95–110)
CHLORIDE SERPL-SCNC: 84 MMOL/L (ref 95–110)
CHLORIDE SERPL-SCNC: 85 MMOL/L (ref 95–110)
CHLORIDE SERPL-SCNC: 88 MMOL/L (ref 95–110)
CHLORIDE SERPL-SCNC: 90 MMOL/L (ref 95–110)
CHLORIDE SERPL-SCNC: 91 MMOL/L (ref 95–110)
CHLORIDE SERPL-SCNC: 91 MMOL/L (ref 95–110)
CHLORIDE SERPL-SCNC: 92 MMOL/L (ref 95–110)
CHLORIDE SERPL-SCNC: 93 MMOL/L (ref 95–110)
CHLORIDE SERPL-SCNC: 93 MMOL/L (ref 95–110)
CHLORIDE SERPL-SCNC: 94 MMOL/L (ref 95–110)
CHLORIDE SERPL-SCNC: 95 MMOL/L (ref 95–110)
CHLORIDE SERPL-SCNC: 96 MMOL/L (ref 95–110)
CHLORIDE SERPL-SCNC: 98 MMOL/L (ref 95–110)
CHLORIDE SERPL-SCNC: 99 MMOL/L (ref 95–110)
CK MB SERPL-MCNC: 11.2 NG/ML (ref 0.1–6.5)
CK MB SERPL-RTO: 1.2 % (ref 0–5)
CK SERPL-CCNC: 32 U/L (ref 20–200)
CK SERPL-CCNC: 943 U/L (ref 20–200)
CK SERPL-CCNC: 943 U/L (ref 20–200)
CLARITY UR REFRACT.AUTO: ABNORMAL
CO2 SERPL-SCNC: 14 MMOL/L (ref 23–29)
CO2 SERPL-SCNC: 14 MMOL/L (ref 23–29)
CO2 SERPL-SCNC: 16 MMOL/L (ref 23–29)
CO2 SERPL-SCNC: 17 MMOL/L (ref 23–29)
CO2 SERPL-SCNC: 17 MMOL/L (ref 23–29)
CO2 SERPL-SCNC: 18 MMOL/L (ref 23–29)
CO2 SERPL-SCNC: 19 MMOL/L (ref 23–29)
CO2 SERPL-SCNC: 20 MMOL/L (ref 23–29)
CO2 SERPL-SCNC: 21 MMOL/L (ref 23–29)
CO2 SERPL-SCNC: 22 MMOL/L (ref 23–29)
CO2 SERPL-SCNC: 23 MMOL/L (ref 23–29)
CO2 SERPL-SCNC: 24 MMOL/L (ref 23–29)
CO2 SERPL-SCNC: 25 MMOL/L (ref 23–29)
CO2 SERPL-SCNC: 26 MMOL/L (ref 23–29)
CO2 SERPL-SCNC: 27 MMOL/L (ref 23–29)
CO2 SERPL-SCNC: 28 MMOL/L (ref 23–29)
CODING SYSTEM: NORMAL
COLOR UR AUTO: YELLOW
CREAT SERPL-MCNC: 0.6 MG/DL (ref 0.5–1.4)
CREAT SERPL-MCNC: 0.7 MG/DL (ref 0.5–1.4)
CREAT SERPL-MCNC: 0.7 MG/DL (ref 0.5–1.4)
CREAT SERPL-MCNC: 0.8 MG/DL (ref 0.5–1.4)
CREAT SERPL-MCNC: 0.9 MG/DL (ref 0.5–1.4)
CREAT SERPL-MCNC: 1.5 MG/DL (ref 0.5–1.4)
CREAT SERPL-MCNC: 1.6 MG/DL (ref 0.5–1.4)
CREAT SERPL-MCNC: 1.6 MG/DL (ref 0.5–1.4)
CREAT SERPL-MCNC: 1.7 MG/DL (ref 0.5–1.4)
CREAT SERPL-MCNC: 1.8 MG/DL (ref 0.5–1.4)
CREAT SERPL-MCNC: 1.9 MG/DL (ref 0.5–1.4)
CREAT SERPL-MCNC: 2 MG/DL (ref 0.5–1.4)
CREAT SERPL-MCNC: 2.1 MG/DL (ref 0.5–1.4)
CREAT SERPL-MCNC: 2.2 MG/DL (ref 0.5–1.4)
CREAT SERPL-MCNC: 2.3 MG/DL (ref 0.5–1.4)
CREAT SERPL-MCNC: 2.4 MG/DL (ref 0.5–1.4)
CREAT SERPL-MCNC: 2.5 MG/DL (ref 0.5–1.4)
CREAT SERPL-MCNC: 2.5 MG/DL (ref 0.5–1.4)
CREAT SERPL-MCNC: 2.7 MG/DL (ref 0.5–1.4)
CREAT SERPL-MCNC: 2.7 MG/DL (ref 0.5–1.4)
CREAT SERPL-MCNC: 2.9 MG/DL (ref 0.5–1.4)
CREAT SERPL-MCNC: 3 MG/DL (ref 0.5–1.4)
CREAT SERPL-MCNC: 3.2 MG/DL (ref 0.5–1.4)
CREAT SERPL-MCNC: 3.4 MG/DL (ref 0.5–1.4)
CREAT SERPL-MCNC: 3.6 MG/DL (ref 0.5–1.4)
CREAT SERPL-MCNC: 3.9 MG/DL (ref 0.5–1.4)
CREAT SERPL-MCNC: 4.3 MG/DL (ref 0.5–1.4)
CRP SERPL-MCNC: 103.5 MG/L (ref 0–8.2)
CRP SERPL-MCNC: 108.9 MG/L (ref 0–8.2)
CRP SERPL-MCNC: 143.3 MG/L (ref 0–8.2)
CRP SERPL-MCNC: 154 MG/L (ref 0–8.2)
CRP SERPL-MCNC: 170.9 MG/L (ref 0–8.2)
CRP SERPL-MCNC: 245.9 MG/L (ref 0–8.2)
CRP SERPL-MCNC: 269.4 MG/L (ref 0–8.2)
CRP SERPL-MCNC: 292.6 MG/L (ref 0–8.2)
CRP SERPL-MCNC: 323 MG/L (ref 0–8.2)
CRP SERPL-MCNC: 50.6 MG/L (ref 0–8.2)
CRP SERPL-MCNC: 77.5 MG/L (ref 0–8.2)
CV ECHO LV RWT: 0.4 CM
D DIMER PPP IA.FEU-MCNC: 22.03 MG/L FEU
D DIMER PPP IA.FEU-MCNC: 4.11 MG/L FEU
D DIMER PPP IA.FEU-MCNC: 4.64 MG/L FEU
D DIMER PPP IA.FEU-MCNC: 5.2 MG/L FEU
D DIMER PPP IA.FEU-MCNC: >33 MG/L FEU
DACRYOCYTES BLD QL SMEAR: ABNORMAL
DACRYOCYTES BLD QL SMEAR: ABNORMAL
DELSYS: ABNORMAL
DIFFERENTIAL METHOD: ABNORMAL
DIGOXIN SERPL-MCNC: 0.4 NG/ML (ref 0.8–2)
DISPENSE STATUS: NORMAL
DOHLE BOD BLD QL SMEAR: PRESENT
DOHLE BOD BLD QL SMEAR: PRESENT
DOP CALC AO PEAK VEL: 1.13 M/S
DOP CALC AO VTI: 21.27 CM
DOP CALC LVOT AREA: 5.7 CM2
DOP CALC LVOT DIAMETER: 2.7 CM
DOP CALC LVOT PEAK VEL: 1.11 M/S
DOP CALC LVOT STROKE VOLUME: 131.96 CM3
DOP CALCLVOT PEAK VEL VTI: 23.06 CM
E WAVE DECELERATION TIME: 257.14 MSEC
E/A RATIO: 1.01
E/E' RATIO: 12.73 M/S
ECHO LV POSTERIOR WALL: 1.07 CM (ref 0.6–1.1)
EJECTION FRACTION: 65 %
EOSINOPHIL # BLD AUTO: 0 K/UL (ref 0–0.5)
EOSINOPHIL # BLD AUTO: 0.1 K/UL (ref 0–0.5)
EOSINOPHIL # BLD AUTO: ABNORMAL K/UL (ref 0–0.5)
EOSINOPHIL NFR BLD: 0 % (ref 0–8)
EOSINOPHIL NFR BLD: 0.1 % (ref 0–8)
EOSINOPHIL NFR BLD: 0.1 % (ref 0–8)
EOSINOPHIL NFR BLD: 0.4 % (ref 0–8)
EOSINOPHIL NFR BLD: 0.5 % (ref 0–8)
EOSINOPHIL NFR BLD: 0.7 % (ref 0–8)
EOSINOPHIL NFR BLD: 0.8 % (ref 0–8)
EOSINOPHIL NFR BLD: 1 % (ref 0–8)
EOSINOPHIL NFR BLD: 1.3 % (ref 0–8)
EOSINOPHIL NFR BLD: 1.6 % (ref 0–8)
EOSINOPHIL NFR BLD: 2 % (ref 0–8)
EOSINOPHIL NFR BLD: 2 % (ref 0–8)
EOSINOPHIL NFR BLD: 3 % (ref 0–8)
EOSINOPHIL NFR BLD: 4 % (ref 0–8)
EP: 12
EP: 7
ERYTHROCYTE [DISTWIDTH] IN BLOOD BY AUTOMATED COUNT: 15.1 % (ref 11.5–14.5)
ERYTHROCYTE [DISTWIDTH] IN BLOOD BY AUTOMATED COUNT: 15.2 % (ref 11.5–14.5)
ERYTHROCYTE [DISTWIDTH] IN BLOOD BY AUTOMATED COUNT: 15.4 % (ref 11.5–14.5)
ERYTHROCYTE [DISTWIDTH] IN BLOOD BY AUTOMATED COUNT: 15.8 % (ref 11.5–14.5)
ERYTHROCYTE [DISTWIDTH] IN BLOOD BY AUTOMATED COUNT: 15.8 % (ref 11.5–14.5)
ERYTHROCYTE [DISTWIDTH] IN BLOOD BY AUTOMATED COUNT: 16 % (ref 11.5–14.5)
ERYTHROCYTE [DISTWIDTH] IN BLOOD BY AUTOMATED COUNT: 16.2 % (ref 11.5–14.5)
ERYTHROCYTE [DISTWIDTH] IN BLOOD BY AUTOMATED COUNT: 16.2 % (ref 11.5–14.5)
ERYTHROCYTE [DISTWIDTH] IN BLOOD BY AUTOMATED COUNT: 16.3 % (ref 11.5–14.5)
ERYTHROCYTE [DISTWIDTH] IN BLOOD BY AUTOMATED COUNT: 16.4 % (ref 11.5–14.5)
ERYTHROCYTE [DISTWIDTH] IN BLOOD BY AUTOMATED COUNT: 16.5 % (ref 11.5–14.5)
ERYTHROCYTE [DISTWIDTH] IN BLOOD BY AUTOMATED COUNT: 16.6 % (ref 11.5–14.5)
ERYTHROCYTE [DISTWIDTH] IN BLOOD BY AUTOMATED COUNT: 16.7 % (ref 11.5–14.5)
ERYTHROCYTE [DISTWIDTH] IN BLOOD BY AUTOMATED COUNT: 17 % (ref 11.5–14.5)
ERYTHROCYTE [DISTWIDTH] IN BLOOD BY AUTOMATED COUNT: 17.5 % (ref 11.5–14.5)
ERYTHROCYTE [DISTWIDTH] IN BLOOD BY AUTOMATED COUNT: 18.4 % (ref 11.5–14.5)
ERYTHROCYTE [DISTWIDTH] IN BLOOD BY AUTOMATED COUNT: 18.8 % (ref 11.5–14.5)
ERYTHROCYTE [DISTWIDTH] IN BLOOD BY AUTOMATED COUNT: 19.6 % (ref 11.5–14.5)
ERYTHROCYTE [SEDIMENTATION RATE] IN BLOOD BY WESTERGREN METHOD: 12 MM/H
ERYTHROCYTE [SEDIMENTATION RATE] IN BLOOD BY WESTERGREN METHOD: 18 MM/H
ERYTHROCYTE [SEDIMENTATION RATE] IN BLOOD BY WESTERGREN METHOD: 24 MM/H
ERYTHROCYTE [SEDIMENTATION RATE] IN BLOOD BY WESTERGREN METHOD: 28 MM/H
ERYTHROCYTE [SEDIMENTATION RATE] IN BLOOD BY WESTERGREN METHOD: 32 MM/H
ERYTHROCYTE [SEDIMENTATION RATE] IN BLOOD BY WESTERGREN METHOD: 32 MM/HR (ref 0–10)
ERYTHROCYTE [SEDIMENTATION RATE] IN BLOOD BY WESTERGREN METHOD: 33 MM/H
ERYTHROCYTE [SEDIMENTATION RATE] IN BLOOD BY WESTERGREN METHOD: 33 MM/H
ERYTHROCYTE [SEDIMENTATION RATE] IN BLOOD BY WESTERGREN METHOD: 34 MM/H
ERYTHROCYTE [SEDIMENTATION RATE] IN BLOOD BY WESTERGREN METHOD: 35 MM/H
ERYTHROCYTE [SEDIMENTATION RATE] IN BLOOD BY WESTERGREN METHOD: 36 MM/H
ERYTHROCYTE [SEDIMENTATION RATE] IN BLOOD BY WESTERGREN METHOD: 37 MM/H
ERYTHROCYTE [SEDIMENTATION RATE] IN BLOOD BY WESTERGREN METHOD: 49 MM/HR (ref 0–23)
EST. GFR  (AFRICAN AMERICAN): 15.7 ML/MIN/1.73 M^2
EST. GFR  (AFRICAN AMERICAN): 17.7 ML/MIN/1.73 M^2
EST. GFR  (AFRICAN AMERICAN): 19.5 ML/MIN/1.73 M^2
EST. GFR  (AFRICAN AMERICAN): 20.8 ML/MIN/1.73 M^2
EST. GFR  (AFRICAN AMERICAN): 22.4 ML/MIN/1.73 M^2
EST. GFR  (AFRICAN AMERICAN): 24.3 ML/MIN/1.73 M^2
EST. GFR  (AFRICAN AMERICAN): 25.3 ML/MIN/1.73 M^2
EST. GFR  (AFRICAN AMERICAN): 27.5 ML/MIN/1.73 M^2
EST. GFR  (AFRICAN AMERICAN): 27.5 ML/MIN/1.73 M^2
EST. GFR  (AFRICAN AMERICAN): 30 ML/MIN/1.73 M^2
EST. GFR  (AFRICAN AMERICAN): 30.2 ML/MIN/1.73 M^2
EST. GFR  (AFRICAN AMERICAN): 31.8 ML/MIN/1.73 M^2
EST. GFR  (AFRICAN AMERICAN): 33.4 ML/MIN/1.73 M^2
EST. GFR  (AFRICAN AMERICAN): 35.3 ML/MIN/1.73 M^2
EST. GFR  (AFRICAN AMERICAN): 37.3 ML/MIN/1.73 M^2
EST. GFR  (AFRICAN AMERICAN): 39.6 ML/MIN/1.73 M^2
EST. GFR  (AFRICAN AMERICAN): 42.1 ML/MIN/1.73 M^2
EST. GFR  (AFRICAN AMERICAN): 45 ML/MIN/1.73 M^2
EST. GFR  (AFRICAN AMERICAN): 48.2 ML/MIN/1.73 M^2
EST. GFR  (AFRICAN AMERICAN): 51.9 ML/MIN/1.73 M^2
EST. GFR  (AFRICAN AMERICAN): 51.9 ML/MIN/1.73 M^2
EST. GFR  (AFRICAN AMERICAN): 56.1 ML/MIN/1.73 M^2
EST. GFR  (AFRICAN AMERICAN): >60 ML/MIN/1.73 M^2
EST. GFR  (NON AFRICAN AMERICAN): 13.6 ML/MIN/1.73 M^2
EST. GFR  (NON AFRICAN AMERICAN): 15.3 ML/MIN/1.73 M^2
EST. GFR  (NON AFRICAN AMERICAN): 16.8 ML/MIN/1.73 M^2
EST. GFR  (NON AFRICAN AMERICAN): 18 ML/MIN/1.73 M^2
EST. GFR  (NON AFRICAN AMERICAN): 19.4 ML/MIN/1.73 M^2
EST. GFR  (NON AFRICAN AMERICAN): 21 ML/MIN/1.73 M^2
EST. GFR  (NON AFRICAN AMERICAN): 21.9 ML/MIN/1.73 M^2
EST. GFR  (NON AFRICAN AMERICAN): 23.8 ML/MIN/1.73 M^2
EST. GFR  (NON AFRICAN AMERICAN): 23.8 ML/MIN/1.73 M^2
EST. GFR  (NON AFRICAN AMERICAN): 26 ML/MIN/1.73 M^2
EST. GFR  (NON AFRICAN AMERICAN): 26.2 ML/MIN/1.73 M^2
EST. GFR  (NON AFRICAN AMERICAN): 27.5 ML/MIN/1.73 M^2
EST. GFR  (NON AFRICAN AMERICAN): 28.9 ML/MIN/1.73 M^2
EST. GFR  (NON AFRICAN AMERICAN): 30.5 ML/MIN/1.73 M^2
EST. GFR  (NON AFRICAN AMERICAN): 32.3 ML/MIN/1.73 M^2
EST. GFR  (NON AFRICAN AMERICAN): 34.3 ML/MIN/1.73 M^2
EST. GFR  (NON AFRICAN AMERICAN): 36.4 ML/MIN/1.73 M^2
EST. GFR  (NON AFRICAN AMERICAN): 38.9 ML/MIN/1.73 M^2
EST. GFR  (NON AFRICAN AMERICAN): 41.7 ML/MIN/1.73 M^2
EST. GFR  (NON AFRICAN AMERICAN): 44.9 ML/MIN/1.73 M^2
EST. GFR  (NON AFRICAN AMERICAN): 44.9 ML/MIN/1.73 M^2
EST. GFR  (NON AFRICAN AMERICAN): 48.5 ML/MIN/1.73 M^2
EST. GFR  (NON AFRICAN AMERICAN): >60 ML/MIN/1.73 M^2
ESTIMATED AVG GLUCOSE: 192 MG/DL (ref 68–131)
ETCO2: 0
FERRITIN SERPL-MCNC: 1395 NG/ML (ref 20–300)
FERRITIN SERPL-MCNC: 1709 NG/ML (ref 20–300)
FERRITIN SERPL-MCNC: 1762 NG/ML (ref 20–300)
FERRITIN SERPL-MCNC: 1853 NG/ML (ref 20–300)
FERRITIN SERPL-MCNC: 2855 NG/ML (ref 20–300)
FIBRINOGEN PPP-MCNC: 543 MG/DL (ref 182–400)
FIO2: 100
FIO2: 45
FIO2: 50
FIO2: 50
FIO2: 55
FIO2: 60
FIO2: 65
FIO2: 65
FIO2: 70
FIO2: 80
FIO2: 90
FIO2: 97
FRACTIONAL SHORTENING: 34 % (ref 28–44)
GIANT PLATELETS BLD QL SMEAR: PRESENT
GIANT PLATELETS BLD QL SMEAR: PRESENT
GLUCOSE SERPL-MCNC: 107 MG/DL (ref 70–110)
GLUCOSE SERPL-MCNC: 109 MG/DL (ref 70–110)
GLUCOSE SERPL-MCNC: 109 MG/DL (ref 70–110)
GLUCOSE SERPL-MCNC: 110 MG/DL (ref 70–110)
GLUCOSE SERPL-MCNC: 116 MG/DL (ref 70–110)
GLUCOSE SERPL-MCNC: 119 MG/DL (ref 70–110)
GLUCOSE SERPL-MCNC: 119 MG/DL (ref 70–110)
GLUCOSE SERPL-MCNC: 122 MG/DL (ref 70–110)
GLUCOSE SERPL-MCNC: 125 MG/DL (ref 70–110)
GLUCOSE SERPL-MCNC: 131 MG/DL (ref 70–110)
GLUCOSE SERPL-MCNC: 132 MG/DL (ref 70–110)
GLUCOSE SERPL-MCNC: 135 MG/DL (ref 70–110)
GLUCOSE SERPL-MCNC: 136 MG/DL (ref 70–110)
GLUCOSE SERPL-MCNC: 139 MG/DL (ref 70–110)
GLUCOSE SERPL-MCNC: 143 MG/DL (ref 70–110)
GLUCOSE SERPL-MCNC: 151 MG/DL (ref 70–110)
GLUCOSE SERPL-MCNC: 154 MG/DL (ref 70–110)
GLUCOSE SERPL-MCNC: 157 MG/DL (ref 70–110)
GLUCOSE SERPL-MCNC: 159 MG/DL (ref 70–110)
GLUCOSE SERPL-MCNC: 159 MG/DL (ref 70–110)
GLUCOSE SERPL-MCNC: 161 MG/DL (ref 70–110)
GLUCOSE SERPL-MCNC: 161 MG/DL (ref 70–110)
GLUCOSE SERPL-MCNC: 170 MG/DL (ref 70–110)
GLUCOSE SERPL-MCNC: 171 MG/DL (ref 70–110)
GLUCOSE SERPL-MCNC: 172 MG/DL (ref 70–110)
GLUCOSE SERPL-MCNC: 173 MG/DL (ref 70–110)
GLUCOSE SERPL-MCNC: 178 MG/DL (ref 70–110)
GLUCOSE SERPL-MCNC: 178 MG/DL (ref 70–110)
GLUCOSE SERPL-MCNC: 180 MG/DL (ref 70–110)
GLUCOSE SERPL-MCNC: 180 MG/DL (ref 70–110)
GLUCOSE SERPL-MCNC: 185 MG/DL (ref 70–110)
GLUCOSE SERPL-MCNC: 185 MG/DL (ref 70–110)
GLUCOSE SERPL-MCNC: 188 MG/DL (ref 70–110)
GLUCOSE SERPL-MCNC: 189 MG/DL (ref 70–110)
GLUCOSE SERPL-MCNC: 193 MG/DL (ref 70–110)
GLUCOSE SERPL-MCNC: 199 MG/DL (ref 70–110)
GLUCOSE SERPL-MCNC: 200 MG/DL (ref 70–110)
GLUCOSE SERPL-MCNC: 200 MG/DL (ref 70–110)
GLUCOSE SERPL-MCNC: 202 MG/DL (ref 70–110)
GLUCOSE SERPL-MCNC: 205 MG/DL (ref 70–110)
GLUCOSE SERPL-MCNC: 206 MG/DL (ref 70–110)
GLUCOSE SERPL-MCNC: 210 MG/DL (ref 70–110)
GLUCOSE SERPL-MCNC: 215 MG/DL (ref 70–110)
GLUCOSE SERPL-MCNC: 229 MG/DL (ref 70–110)
GLUCOSE SERPL-MCNC: 231 MG/DL (ref 70–110)
GLUCOSE SERPL-MCNC: 233 MG/DL (ref 70–110)
GLUCOSE SERPL-MCNC: 235 MG/DL (ref 70–110)
GLUCOSE SERPL-MCNC: 235 MG/DL (ref 70–110)
GLUCOSE SERPL-MCNC: 245 MG/DL (ref 70–110)
GLUCOSE SERPL-MCNC: 255 MG/DL (ref 70–110)
GLUCOSE SERPL-MCNC: 260 MG/DL (ref 70–110)
GLUCOSE SERPL-MCNC: 268 MG/DL (ref 70–110)
GLUCOSE SERPL-MCNC: 272 MG/DL (ref 70–110)
GLUCOSE SERPL-MCNC: 292 MG/DL (ref 70–110)
GLUCOSE SERPL-MCNC: 303 MG/DL (ref 70–110)
GLUCOSE SERPL-MCNC: 365 MG/DL (ref 70–110)
GLUCOSE SERPL-MCNC: 372 MG/DL (ref 70–110)
GLUCOSE SERPL-MCNC: 383 MG/DL (ref 70–110)
GLUCOSE SERPL-MCNC: 388 MG/DL (ref 70–110)
GLUCOSE SERPL-MCNC: 484 MG/DL (ref 70–110)
GLUCOSE SERPL-MCNC: 495 MG/DL (ref 70–110)
GLUCOSE SERPL-MCNC: 496 MG/DL (ref 70–110)
GLUCOSE SERPL-MCNC: 500 MG/DL (ref 70–110)
GLUCOSE SERPL-MCNC: 60 MG/DL (ref 70–110)
GLUCOSE SERPL-MCNC: 76 MG/DL (ref 70–110)
GLUCOSE SERPL-MCNC: 84 MG/DL (ref 70–110)
GLUCOSE SERPL-MCNC: 92 MG/DL (ref 70–110)
GLUCOSE SERPL-MCNC: 92 MG/DL (ref 70–110)
GLUCOSE SERPL-MCNC: 97 MG/DL (ref 70–110)
GLUCOSE UR QL STRIP: ABNORMAL
GRAM STN SPEC: NORMAL
HBA1C MFR BLD: 8.3 % (ref 4–5.6)
HBV CORE AB SERPL QL IA: POSITIVE
HBV SURFACE AG SERPL QL IA: NEGATIVE
HCO3 UR-SCNC: 14.9 MMOL/L (ref 22–26)
HCO3 UR-SCNC: 20.1 MMOL/L (ref 24–28)
HCO3 UR-SCNC: 20.3 MMOL/L (ref 24–28)
HCO3 UR-SCNC: 21.1 MMOL/L (ref 24–28)
HCO3 UR-SCNC: 21.6 MMOL/L (ref 24–28)
HCO3 UR-SCNC: 21.7 MMOL/L (ref 24–28)
HCO3 UR-SCNC: 22 MMOL/L (ref 24–28)
HCO3 UR-SCNC: 22.1 MMOL/L (ref 24–28)
HCO3 UR-SCNC: 22.3 MMOL/L (ref 24–28)
HCO3 UR-SCNC: 22.3 MMOL/L (ref 24–28)
HCO3 UR-SCNC: 22.4 MMOL/L (ref 24–28)
HCO3 UR-SCNC: 22.7 MMOL/L (ref 24–28)
HCO3 UR-SCNC: 23.2 MMOL/L (ref 24–28)
HCO3 UR-SCNC: 23.3 MMOL/L (ref 24–28)
HCO3 UR-SCNC: 23.8 MMOL/L (ref 24–28)
HCO3 UR-SCNC: 24.2 MMOL/L (ref 24–28)
HCO3 UR-SCNC: 25.6 MMOL/L (ref 24–28)
HCO3 UR-SCNC: 26.3 MMOL/L (ref 24–28)
HCO3 UR-SCNC: 26.3 MMOL/L (ref 24–28)
HCO3 UR-SCNC: 27.2 MMOL/L (ref 24–28)
HCO3 UR-SCNC: 27.3 MMOL/L (ref 24–28)
HCO3 UR-SCNC: 27.6 MMOL/L (ref 24–28)
HCO3 UR-SCNC: 27.8 MMOL/L (ref 24–28)
HCO3 UR-SCNC: 28 MMOL/L (ref 24–28)
HCO3 UR-SCNC: 28.3 MMOL/L (ref 24–28)
HCO3 UR-SCNC: 28.3 MMOL/L (ref 24–28)
HCO3 UR-SCNC: 28.7 MMOL/L (ref 24–28)
HCO3 UR-SCNC: 28.8 MMOL/L (ref 24–28)
HCO3 UR-SCNC: 28.9 MMOL/L (ref 24–28)
HCO3 UR-SCNC: 29.4 MMOL/L (ref 24–28)
HCO3 UR-SCNC: 29.5 MMOL/L (ref 24–28)
HCO3 UR-SCNC: 29.6 MMOL/L (ref 24–28)
HCO3 UR-SCNC: 30.1 MMOL/L (ref 24–28)
HCO3 UR-SCNC: 30.2 MMOL/L (ref 24–28)
HCO3 UR-SCNC: 30.7 MMOL/L (ref 24–28)
HCO3 UR-SCNC: 30.8 MMOL/L (ref 24–28)
HCO3 UR-SCNC: 31 MMOL/L (ref 24–28)
HCO3 UR-SCNC: 31.3 MMOL/L (ref 24–28)
HCO3 UR-SCNC: 32 MMOL/L (ref 24–28)
HCO3 UR-SCNC: 32.2 MMOL/L (ref 24–28)
HCO3 UR-SCNC: 32.3 MMOL/L (ref 24–28)
HCO3 UR-SCNC: 32.8 MMOL/L (ref 24–28)
HCO3 UR-SCNC: 32.8 MMOL/L (ref 24–28)
HCO3 UR-SCNC: 32.9 MMOL/L (ref 24–28)
HCO3 UR-SCNC: 32.9 MMOL/L (ref 24–28)
HCO3 UR-SCNC: 33.5 MMOL/L (ref 24–28)
HCO3 UR-SCNC: 33.6 MMOL/L (ref 24–28)
HCO3 UR-SCNC: 33.6 MMOL/L (ref 24–28)
HCO3 UR-SCNC: 33.8 MMOL/L (ref 24–28)
HCO3 UR-SCNC: 34.3 MMOL/L (ref 24–28)
HCO3 UR-SCNC: 37.3 MMOL/L (ref 24–28)
HCO3 UR-SCNC: 37.3 MMOL/L (ref 24–28)
HCT VFR BLD AUTO: 20.4 % (ref 40–54)
HCT VFR BLD AUTO: 21 % (ref 40–54)
HCT VFR BLD AUTO: 21.4 % (ref 40–54)
HCT VFR BLD AUTO: 22 % (ref 40–54)
HCT VFR BLD AUTO: 22.2 % (ref 40–54)
HCT VFR BLD AUTO: 23.1 % (ref 40–54)
HCT VFR BLD AUTO: 23.2 % (ref 40–54)
HCT VFR BLD AUTO: 23.4 % (ref 40–54)
HCT VFR BLD AUTO: 24 % (ref 40–54)
HCT VFR BLD AUTO: 24.4 % (ref 40–54)
HCT VFR BLD AUTO: 24.6 % (ref 40–54)
HCT VFR BLD AUTO: 25.7 % (ref 40–54)
HCT VFR BLD AUTO: 27.2 % (ref 40–54)
HCT VFR BLD AUTO: 27.3 % (ref 40–54)
HCT VFR BLD AUTO: 28.8 % (ref 40–54)
HCT VFR BLD AUTO: 29.2 % (ref 40–54)
HCT VFR BLD AUTO: 29.3 % (ref 40–54)
HCT VFR BLD AUTO: 30.7 % (ref 40–54)
HCT VFR BLD AUTO: 31.7 % (ref 40–54)
HCT VFR BLD AUTO: 31.9 % (ref 40–54)
HCT VFR BLD AUTO: 31.9 % (ref 40–54)
HCT VFR BLD AUTO: 35.3 % (ref 40–54)
HCT VFR BLD AUTO: 35.3 % (ref 40–54)
HCT VFR BLD AUTO: 35.8 % (ref 40–54)
HCT VFR BLD CALC: 34 %PCV (ref 36–54)
HGB BLD-MCNC: 10.1 G/DL (ref 14–18)
HGB BLD-MCNC: 10.3 G/DL (ref 14–18)
HGB BLD-MCNC: 10.7 G/DL (ref 14–18)
HGB BLD-MCNC: 10.7 G/DL (ref 14–18)
HGB BLD-MCNC: 10.8 G/DL (ref 14–18)
HGB BLD-MCNC: 11.3 G/DL (ref 14–18)
HGB BLD-MCNC: 11.7 G/DL (ref 14–18)
HGB BLD-MCNC: 6.9 G/DL (ref 14–18)
HGB BLD-MCNC: 6.9 G/DL (ref 14–18)
HGB BLD-MCNC: 7 G/DL (ref 14–18)
HGB BLD-MCNC: 7.1 G/DL (ref 14–18)
HGB BLD-MCNC: 7.2 G/DL (ref 14–18)
HGB BLD-MCNC: 7.2 G/DL (ref 14–18)
HGB BLD-MCNC: 7.3 G/DL (ref 14–18)
HGB BLD-MCNC: 7.4 G/DL (ref 14–18)
HGB BLD-MCNC: 7.6 G/DL (ref 14–18)
HGB BLD-MCNC: 7.7 G/DL (ref 14–18)
HGB BLD-MCNC: 7.9 G/DL (ref 14–18)
HGB BLD-MCNC: 8.2 G/DL (ref 14–18)
HGB BLD-MCNC: 8.2 G/DL (ref 14–18)
HGB BLD-MCNC: 8.3 G/DL (ref 14–18)
HGB BLD-MCNC: 8.6 G/DL (ref 14–18)
HGB BLD-MCNC: 9.1 G/DL (ref 14–18)
HGB BLD-MCNC: 9.8 G/DL (ref 14–18)
HGB UR QL STRIP: NEGATIVE
HYALINE CASTS UR QL AUTO: 6 /LPF
HYPOCHROMIA BLD QL SMEAR: ABNORMAL
IMM GRANULOCYTES # BLD AUTO: 0.04 K/UL (ref 0–0.04)
IMM GRANULOCYTES # BLD AUTO: 0.05 K/UL (ref 0–0.04)
IMM GRANULOCYTES # BLD AUTO: 0.05 K/UL (ref 0–0.04)
IMM GRANULOCYTES # BLD AUTO: 0.08 K/UL (ref 0–0.04)
IMM GRANULOCYTES # BLD AUTO: 0.08 K/UL (ref 0–0.04)
IMM GRANULOCYTES # BLD AUTO: 0.1 K/UL (ref 0–0.04)
IMM GRANULOCYTES # BLD AUTO: 0.13 K/UL (ref 0–0.04)
IMM GRANULOCYTES # BLD AUTO: 0.13 K/UL (ref 0–0.04)
IMM GRANULOCYTES # BLD AUTO: 0.14 K/UL (ref 0–0.04)
IMM GRANULOCYTES # BLD AUTO: 0.21 K/UL (ref 0–0.04)
IMM GRANULOCYTES # BLD AUTO: 0.3 K/UL (ref 0–0.04)
IMM GRANULOCYTES # BLD AUTO: 0.4 K/UL (ref 0–0.04)
IMM GRANULOCYTES # BLD AUTO: 0.58 K/UL (ref 0–0.04)
IMM GRANULOCYTES # BLD AUTO: ABNORMAL K/UL (ref 0–0.04)
IMM GRANULOCYTES NFR BLD AUTO: 1.1 % (ref 0–0.5)
IMM GRANULOCYTES NFR BLD AUTO: 1.2 % (ref 0–0.5)
IMM GRANULOCYTES NFR BLD AUTO: 1.3 % (ref 0–0.5)
IMM GRANULOCYTES NFR BLD AUTO: 1.4 % (ref 0–0.5)
IMM GRANULOCYTES NFR BLD AUTO: 1.5 % (ref 0–0.5)
IMM GRANULOCYTES NFR BLD AUTO: 1.6 % (ref 0–0.5)
IMM GRANULOCYTES NFR BLD AUTO: 1.7 % (ref 0–0.5)
IMM GRANULOCYTES NFR BLD AUTO: 2.1 % (ref 0–0.5)
IMM GRANULOCYTES NFR BLD AUTO: 2.2 % (ref 0–0.5)
IMM GRANULOCYTES NFR BLD AUTO: 2.4 % (ref 0–0.5)
IMM GRANULOCYTES NFR BLD AUTO: 3 % (ref 0–0.5)
IMM GRANULOCYTES NFR BLD AUTO: 3.6 % (ref 0–0.5)
IMM GRANULOCYTES NFR BLD AUTO: 4.1 % (ref 0–0.5)
IMM GRANULOCYTES NFR BLD AUTO: 4.7 % (ref 0–0.5)
IMM GRANULOCYTES NFR BLD AUTO: 4.8 % (ref 0–0.5)
IMM GRANULOCYTES NFR BLD AUTO: ABNORMAL % (ref 0–0.5)
INR PPP: 1 (ref 0.8–1.2)
INR PPP: 1 (ref 0.8–1.2)
INR PPP: 1.1 (ref 0.8–1.2)
INTERVENTRICULAR SEPTUM: 1.14 CM (ref 0.6–1.1)
IP: 12
IP: 14
IP: 22
IP: 24
IT: 0.7
IT: 0.8
KETONES UR QL STRIP: NEGATIVE
LA MAJOR: 5.09 CM
LA MINOR: 5.54 CM
LA WIDTH: 4.78 CM
LACTATE SERPL-SCNC: 1.1 MMOL/L (ref 0.5–2.2)
LACTATE SERPL-SCNC: 2.9 MMOL/L (ref 0.5–2.2)
LDH SERPL L TO P-CCNC: 473 U/L (ref 110–260)
LDH SERPL L TO P-CCNC: 490 U/L (ref 110–260)
LDH SERPL L TO P-CCNC: 611 U/L (ref 110–260)
LDH SERPL L TO P-CCNC: 715 U/L (ref 110–260)
LDH SERPL L TO P-CCNC: 778 U/L (ref 110–260)
LDH SERPL L TO P-CCNC: 795 U/L (ref 110–260)
LEFT ATRIUM SIZE: 4.79 CM
LEFT ATRIUM VOLUME INDEX MOD: 36.7 ML/M2
LEFT ATRIUM VOLUME INDEX: 47.4 ML/M2
LEFT ATRIUM VOLUME MOD: 80 CM3
LEFT ATRIUM VOLUME: 103.25 CM3
LEFT INTERNAL DIMENSION IN SYSTOLE: 3.53 CM (ref 2.1–4)
LEFT VENTRICLE DIASTOLIC VOLUME INDEX: 63.08 ML/M2
LEFT VENTRICLE DIASTOLIC VOLUME: 137.52 ML
LEFT VENTRICLE MASS INDEX: 106 G/M2
LEFT VENTRICLE SYSTOLIC VOLUME INDEX: 23.8 ML/M2
LEFT VENTRICLE SYSTOLIC VOLUME: 51.85 ML
LEFT VENTRICULAR INTERNAL DIMENSION IN DIASTOLE: 5.34 CM (ref 3.5–6)
LEFT VENTRICULAR MASS: 231.98 G
LEUKOCYTE ESTERASE UR QL STRIP: NEGATIVE
LV LATERAL E/E' RATIO: 11.67 M/S
LV SEPTAL E/E' RATIO: 14 M/S
LYMPHOCYTES # BLD AUTO: 0.1 K/UL (ref 1–4.8)
LYMPHOCYTES # BLD AUTO: 0.1 K/UL (ref 1–4.8)
LYMPHOCYTES # BLD AUTO: 0.2 K/UL (ref 1–4.8)
LYMPHOCYTES # BLD AUTO: 0.3 K/UL (ref 1–4.8)
LYMPHOCYTES # BLD AUTO: 0.5 K/UL (ref 1–4.8)
LYMPHOCYTES # BLD AUTO: ABNORMAL K/UL (ref 1–4.8)
LYMPHOCYTES NFR BLD: 0 % (ref 18–48)
LYMPHOCYTES NFR BLD: 1.9 % (ref 18–48)
LYMPHOCYTES NFR BLD: 2 % (ref 18–48)
LYMPHOCYTES NFR BLD: 2.1 % (ref 18–48)
LYMPHOCYTES NFR BLD: 2.4 % (ref 18–48)
LYMPHOCYTES NFR BLD: 2.5 % (ref 18–48)
LYMPHOCYTES NFR BLD: 3 % (ref 18–48)
LYMPHOCYTES NFR BLD: 3.1 % (ref 18–48)
LYMPHOCYTES NFR BLD: 4 % (ref 18–48)
LYMPHOCYTES NFR BLD: 4 % (ref 18–48)
LYMPHOCYTES NFR BLD: 4.1 % (ref 18–48)
LYMPHOCYTES NFR BLD: 4.5 % (ref 18–48)
LYMPHOCYTES NFR BLD: 4.7 % (ref 18–48)
LYMPHOCYTES NFR BLD: 4.7 % (ref 18–48)
LYMPHOCYTES NFR BLD: 4.9 % (ref 18–48)
LYMPHOCYTES NFR BLD: 5.5 % (ref 18–48)
LYMPHOCYTES NFR BLD: 5.9 % (ref 18–48)
LYMPHOCYTES NFR BLD: 6 % (ref 18–48)
LYMPHOCYTES NFR BLD: 6.2 % (ref 18–48)
LYMPHOCYTES NFR BLD: 7 % (ref 18–48)
LYMPHOCYTES NFR BLD: 7.2 % (ref 18–48)
LYMPHOCYTES NFR BLD: 8.9 % (ref 18–48)
MAGNESIUM SERPL-MCNC: 1.7 MG/DL (ref 1.6–2.6)
MAGNESIUM SERPL-MCNC: 1.8 MG/DL (ref 1.6–2.6)
MAGNESIUM SERPL-MCNC: 1.9 MG/DL (ref 1.6–2.6)
MAGNESIUM SERPL-MCNC: 2 MG/DL (ref 1.6–2.6)
MAGNESIUM SERPL-MCNC: 2.1 MG/DL (ref 1.6–2.6)
MAGNESIUM SERPL-MCNC: 2.2 MG/DL (ref 1.6–2.6)
MAGNESIUM SERPL-MCNC: 2.3 MG/DL (ref 1.6–2.6)
MAGNESIUM SERPL-MCNC: 2.5 MG/DL (ref 1.6–2.6)
MAGNESIUM SERPL-MCNC: 2.5 MG/DL (ref 1.6–2.6)
MAGNESIUM SERPL-MCNC: 2.6 MG/DL (ref 1.6–2.6)
MAGNESIUM SERPL-MCNC: 2.8 MG/DL (ref 1.6–2.6)
MAGNESIUM SERPL-MCNC: 2.9 MG/DL (ref 1.6–2.6)
MAP: 19
MAP: 19
MAP: 21
MCH RBC QN AUTO: 26.3 PG (ref 27–31)
MCH RBC QN AUTO: 26.6 PG (ref 27–31)
MCH RBC QN AUTO: 26.6 PG (ref 27–31)
MCH RBC QN AUTO: 26.7 PG (ref 27–31)
MCH RBC QN AUTO: 26.8 PG (ref 27–31)
MCH RBC QN AUTO: 26.9 PG (ref 27–31)
MCH RBC QN AUTO: 27 PG (ref 27–31)
MCH RBC QN AUTO: 27 PG (ref 27–31)
MCH RBC QN AUTO: 27.1 PG (ref 27–31)
MCH RBC QN AUTO: 27.2 PG (ref 27–31)
MCH RBC QN AUTO: 27.4 PG (ref 27–31)
MCH RBC QN AUTO: 27.4 PG (ref 27–31)
MCH RBC QN AUTO: 27.5 PG (ref 27–31)
MCH RBC QN AUTO: 27.7 PG (ref 27–31)
MCH RBC QN AUTO: 27.9 PG (ref 27–31)
MCH RBC QN AUTO: 27.9 PG (ref 27–31)
MCH RBC QN AUTO: 28.1 PG (ref 27–31)
MCHC RBC AUTO-ENTMCNC: 29.9 G/DL (ref 32–36)
MCHC RBC AUTO-ENTMCNC: 30 G/DL (ref 32–36)
MCHC RBC AUTO-ENTMCNC: 30.1 G/DL (ref 32–36)
MCHC RBC AUTO-ENTMCNC: 30.2 G/DL (ref 32–36)
MCHC RBC AUTO-ENTMCNC: 30.4 G/DL (ref 32–36)
MCHC RBC AUTO-ENTMCNC: 30.6 G/DL (ref 32–36)
MCHC RBC AUTO-ENTMCNC: 30.6 G/DL (ref 32–36)
MCHC RBC AUTO-ENTMCNC: 30.8 G/DL (ref 32–36)
MCHC RBC AUTO-ENTMCNC: 31 G/DL (ref 32–36)
MCHC RBC AUTO-ENTMCNC: 31.2 G/DL (ref 32–36)
MCHC RBC AUTO-ENTMCNC: 31.2 G/DL (ref 32–36)
MCHC RBC AUTO-ENTMCNC: 31.6 G/DL (ref 32–36)
MCHC RBC AUTO-ENTMCNC: 31.7 G/DL (ref 32–36)
MCHC RBC AUTO-ENTMCNC: 32.4 G/DL (ref 32–36)
MCHC RBC AUTO-ENTMCNC: 32.5 G/DL (ref 32–36)
MCHC RBC AUTO-ENTMCNC: 32.9 G/DL (ref 32–36)
MCHC RBC AUTO-ENTMCNC: 33.1 G/DL (ref 32–36)
MCHC RBC AUTO-ENTMCNC: 33.3 G/DL (ref 32–36)
MCHC RBC AUTO-ENTMCNC: 33.4 G/DL (ref 32–36)
MCHC RBC AUTO-ENTMCNC: 33.5 G/DL (ref 32–36)
MCHC RBC AUTO-ENTMCNC: 33.6 G/DL (ref 32–36)
MCHC RBC AUTO-ENTMCNC: 33.8 G/DL (ref 32–36)
MCHC RBC AUTO-ENTMCNC: 33.8 G/DL (ref 32–36)
MCHC RBC AUTO-ENTMCNC: 34.1 G/DL (ref 32–36)
MCHC RBC AUTO-ENTMCNC: 34.7 G/DL (ref 32–36)
MCHC RBC AUTO-ENTMCNC: 35 G/DL (ref 32–36)
MCV RBC AUTO: 78 FL (ref 82–98)
MCV RBC AUTO: 79 FL (ref 82–98)
MCV RBC AUTO: 80 FL (ref 82–98)
MCV RBC AUTO: 81 FL (ref 82–98)
MCV RBC AUTO: 81 FL (ref 82–98)
MCV RBC AUTO: 82 FL (ref 82–98)
MCV RBC AUTO: 83 FL (ref 82–98)
MCV RBC AUTO: 84 FL (ref 82–98)
MCV RBC AUTO: 85 FL (ref 82–98)
MCV RBC AUTO: 85 FL (ref 82–98)
MCV RBC AUTO: 86 FL (ref 82–98)
MCV RBC AUTO: 87 FL (ref 82–98)
MCV RBC AUTO: 88 FL (ref 82–98)
MCV RBC AUTO: 88 FL (ref 82–98)
MCV RBC AUTO: 89 FL (ref 82–98)
MCV RBC AUTO: 90 FL (ref 82–98)
MCV RBC AUTO: 91 FL (ref 82–98)
METAMYELOCYTES NFR BLD MANUAL: 0.7 %
METAMYELOCYTES NFR BLD MANUAL: 0.7 %
METAMYELOCYTES NFR BLD MANUAL: 1 %
METAMYELOCYTES NFR BLD MANUAL: 1 %
METAMYELOCYTES NFR BLD MANUAL: 2 %
METAMYELOCYTES NFR BLD MANUAL: 2 %
METAMYELOCYTES NFR BLD MANUAL: 3 %
METAMYELOCYTES NFR BLD MANUAL: 4 %
MICROSCOPIC COMMENT: ABNORMAL
MIN VOL: 12.9
MIN VOL: 12.9
MIN VOL: 14.2
MIN VOL: 14.3
MIN VOL: 14.6
MIN VOL: 15
MIN VOL: 15.1
MIN VOL: 15.7
MIN VOL: 16.4
MIN VOL: 16.8
MIN VOL: 17.5
MIN VOL: 18.4
MIN VOL: 19.4
MIN VOL: 19.7
MIN VOL: 28
MODE: ABNORMAL
MONOCYTES # BLD AUTO: 0.1 K/UL (ref 0.3–1)
MONOCYTES # BLD AUTO: 0.2 K/UL (ref 0.3–1)
MONOCYTES # BLD AUTO: 0.3 K/UL (ref 0.3–1)
MONOCYTES # BLD AUTO: 0.3 K/UL (ref 0.3–1)
MONOCYTES # BLD AUTO: 0.4 K/UL (ref 0.3–1)
MONOCYTES # BLD AUTO: 0.5 K/UL (ref 0.3–1)
MONOCYTES # BLD AUTO: ABNORMAL K/UL (ref 0.3–1)
MONOCYTES NFR BLD: 1 % (ref 4–15)
MONOCYTES NFR BLD: 2 % (ref 4–15)
MONOCYTES NFR BLD: 2 % (ref 4–15)
MONOCYTES NFR BLD: 3 % (ref 4–15)
MONOCYTES NFR BLD: 3.3 % (ref 4–15)
MONOCYTES NFR BLD: 3.3 % (ref 4–15)
MONOCYTES NFR BLD: 3.6 % (ref 4–15)
MONOCYTES NFR BLD: 3.6 % (ref 4–15)
MONOCYTES NFR BLD: 3.9 % (ref 4–15)
MONOCYTES NFR BLD: 4 % (ref 4–15)
MONOCYTES NFR BLD: 4 % (ref 4–15)
MONOCYTES NFR BLD: 4.1 % (ref 4–15)
MONOCYTES NFR BLD: 4.2 % (ref 4–15)
MONOCYTES NFR BLD: 4.4 % (ref 4–15)
MONOCYTES NFR BLD: 4.5 % (ref 4–15)
MONOCYTES NFR BLD: 4.6 % (ref 4–15)
MONOCYTES NFR BLD: 4.9 % (ref 4–15)
MONOCYTES NFR BLD: 5 % (ref 4–15)
MONOCYTES NFR BLD: 5 % (ref 4–15)
MONOCYTES NFR BLD: 5.1 % (ref 4–15)
MONOCYTES NFR BLD: 5.2 % (ref 4–15)
MONOCYTES NFR BLD: 5.8 % (ref 4–15)
MONOCYTES NFR BLD: 6 % (ref 4–15)
MONOCYTES NFR BLD: 6.5 % (ref 4–15)
MONOCYTES NFR BLD: 6.6 % (ref 4–15)
MONOCYTES NFR BLD: 7.3 % (ref 4–15)
MV PEAK A VEL: 0.69 M/S
MV PEAK E VEL: 0.7 M/S
MV STENOSIS PRESSURE HALF TIME: 74.57 MS
MV VALVE AREA P 1/2 METHOD: 2.95 CM2
MYELOCYTES NFR BLD MANUAL: 1 %
MYELOCYTES NFR BLD MANUAL: 1.3 %
MYELOCYTES NFR BLD MANUAL: 2 %
MYELOCYTES NFR BLD MANUAL: 2.7 %
MYELOCYTES NFR BLD MANUAL: 4 %
MYELOCYTES NFR BLD MANUAL: 4 %
MYELOCYTES NFR BLD MANUAL: 5.5 %
NEUTROPHILS # BLD AUTO: 10.9 K/UL (ref 1.8–7.7)
NEUTROPHILS # BLD AUTO: 11.4 K/UL (ref 1.8–7.7)
NEUTROPHILS # BLD AUTO: 2.4 K/UL (ref 1.8–7.7)
NEUTROPHILS # BLD AUTO: 2.4 K/UL (ref 1.8–7.7)
NEUTROPHILS # BLD AUTO: 3.2 K/UL (ref 1.8–7.7)
NEUTROPHILS # BLD AUTO: 3.5 K/UL (ref 1.8–7.7)
NEUTROPHILS # BLD AUTO: 3.6 K/UL (ref 1.8–7.7)
NEUTROPHILS # BLD AUTO: 4 K/UL (ref 1.8–7.7)
NEUTROPHILS # BLD AUTO: 4.3 K/UL (ref 1.8–7.7)
NEUTROPHILS # BLD AUTO: 4.5 K/UL (ref 1.8–7.7)
NEUTROPHILS # BLD AUTO: 5.2 K/UL (ref 1.8–7.7)
NEUTROPHILS # BLD AUTO: 5.4 K/UL (ref 1.8–7.7)
NEUTROPHILS # BLD AUTO: 5.9 K/UL (ref 1.8–7.7)
NEUTROPHILS # BLD AUTO: 8.8 K/UL (ref 1.8–7.7)
NEUTROPHILS # BLD AUTO: 9.6 K/UL (ref 1.8–7.7)
NEUTROPHILS NFR BLD: 75.5 % (ref 38–73)
NEUTROPHILS NFR BLD: 76.6 % (ref 38–73)
NEUTROPHILS NFR BLD: 81.3 % (ref 38–73)
NEUTROPHILS NFR BLD: 82 % (ref 38–73)
NEUTROPHILS NFR BLD: 82.2 % (ref 38–73)
NEUTROPHILS NFR BLD: 83 % (ref 38–73)
NEUTROPHILS NFR BLD: 83.2 % (ref 38–73)
NEUTROPHILS NFR BLD: 84 % (ref 38–73)
NEUTROPHILS NFR BLD: 84 % (ref 38–73)
NEUTROPHILS NFR BLD: 84.7 % (ref 38–73)
NEUTROPHILS NFR BLD: 85 % (ref 38–73)
NEUTROPHILS NFR BLD: 85 % (ref 38–73)
NEUTROPHILS NFR BLD: 87 % (ref 38–73)
NEUTROPHILS NFR BLD: 87.4 % (ref 38–73)
NEUTROPHILS NFR BLD: 87.4 % (ref 38–73)
NEUTROPHILS NFR BLD: 87.6 % (ref 38–73)
NEUTROPHILS NFR BLD: 88 % (ref 38–73)
NEUTROPHILS NFR BLD: 88.1 % (ref 38–73)
NEUTROPHILS NFR BLD: 88.2 % (ref 38–73)
NEUTROPHILS NFR BLD: 88.9 % (ref 38–73)
NEUTROPHILS NFR BLD: 90.2 % (ref 38–73)
NEUTROPHILS NFR BLD: 90.3 % (ref 38–73)
NEUTROPHILS NFR BLD: 91 % (ref 38–73)
NEUTROPHILS NFR BLD: 91.7 % (ref 38–73)
NEUTROPHILS NFR BLD: 92.3 % (ref 38–73)
NEUTROPHILS NFR BLD: 97 % (ref 38–73)
NEUTS BAND NFR BLD MANUAL: 2 %
NEUTS BAND NFR BLD MANUAL: 3.3 %
NEUTS BAND NFR BLD MANUAL: 5 %
NEUTS BAND NFR BLD MANUAL: 5 %
NEUTS BAND NFR BLD MANUAL: 5.5 %
NEUTS BAND NFR BLD MANUAL: 6.7 %
NEUTS BAND NFR BLD MANUAL: 6.7 %
NITRITE UR QL STRIP: NEGATIVE
NRBC BLD-RTO: 0 /100 WBC
NRBC BLD-RTO: 1 /100 WBC
NRBC BLD-RTO: 4 /100 WBC
NRBC BLD-RTO: 4 /100 WBC
NUM UNITS TRANS PACKED RBC: NORMAL
OVALOCYTES BLD QL SMEAR: ABNORMAL
PCO2 BLDA: 101.9 MMHG (ref 35–45)
PCO2 BLDA: 23 MMHG (ref 35–45)
PCO2 BLDA: 30.9 MMHG (ref 35–45)
PCO2 BLDA: 34.8 MMHG (ref 35–45)
PCO2 BLDA: 37.2 MMHG (ref 35–45)
PCO2 BLDA: 38 MMHG (ref 35–45)
PCO2 BLDA: 38.7 MMHG (ref 35–45)
PCO2 BLDA: 42.9 MMHG (ref 35–45)
PCO2 BLDA: 45.7 MMHG (ref 35–45)
PCO2 BLDA: 45.7 MMHG (ref 35–45)
PCO2 BLDA: 45.8 MMHG (ref 35–45)
PCO2 BLDA: 47.2 MMHG (ref 35–45)
PCO2 BLDA: 47.7 MMHG (ref 35–45)
PCO2 BLDA: 48.2 MMHG (ref 35–45)
PCO2 BLDA: 48.9 MMHG (ref 35–45)
PCO2 BLDA: 50.9 MMHG (ref 35–45)
PCO2 BLDA: 51.4 MMHG (ref 35–45)
PCO2 BLDA: 51.5 MMHG (ref 35–45)
PCO2 BLDA: 51.6 MMHG (ref 35–45)
PCO2 BLDA: 51.8 MMHG (ref 35–45)
PCO2 BLDA: 53.3 MMHG (ref 35–45)
PCO2 BLDA: 53.7 MMHG (ref 35–45)
PCO2 BLDA: 56.1 MMHG (ref 35–45)
PCO2 BLDA: 57.3 MMHG (ref 35–45)
PCO2 BLDA: 57.5 MMHG (ref 35–45)
PCO2 BLDA: 58.4 MMHG (ref 35–45)
PCO2 BLDA: 58.4 MMHG (ref 35–45)
PCO2 BLDA: 59.7 MMHG (ref 35–45)
PCO2 BLDA: 61.1 MMHG (ref 35–45)
PCO2 BLDA: 61.3 MMHG (ref 35–45)
PCO2 BLDA: 61.4 MMHG (ref 35–45)
PCO2 BLDA: 61.5 MMHG (ref 35–45)
PCO2 BLDA: 61.8 MMHG (ref 35–45)
PCO2 BLDA: 62.5 MMHG (ref 35–45)
PCO2 BLDA: 63.8 MMHG (ref 35–45)
PCO2 BLDA: 64.4 MMHG (ref 35–45)
PCO2 BLDA: 64.6 MMHG (ref 35–45)
PCO2 BLDA: 65.8 MMHG (ref 35–45)
PCO2 BLDA: 66.4 MMHG (ref 35–45)
PCO2 BLDA: 66.9 MMHG (ref 35–45)
PCO2 BLDA: 67.3 MMHG (ref 35–45)
PCO2 BLDA: 71 MMHG (ref 35–45)
PCO2 BLDA: 72.7 MMHG (ref 35–45)
PCO2 BLDA: 72.8 MMHG (ref 35–45)
PCO2 BLDA: 74 MMHG (ref 35–45)
PCO2 BLDA: 74.9 MMHG (ref 35–45)
PCO2 BLDA: 75.4 MMHG (ref 35–45)
PCO2 BLDA: 79.8 MMHG (ref 35–45)
PCO2 BLDA: 80.4 MMHG (ref 35–45)
PCO2 BLDA: 82.6 MMHG (ref 35–45)
PCO2 BLDA: 83.4 MMHG (ref 35–45)
PCO2 BLDA: 88.7 MMHG (ref 35–45)
PCO2 BLDA: 89.3 MMHG (ref 35–45)
PCO2 BLDA: 93 MMHG (ref 35–45)
PEEP: 10
PEEP: 12
PEEP: 5
PEEP: 5
PEEP: 8
PH SMN: 7.06 [PH] (ref 7.35–7.45)
PH SMN: 7.11 [PH] (ref 7.35–7.45)
PH SMN: 7.12 [PH] (ref 7.35–7.45)
PH SMN: 7.12 [PH] (ref 7.35–7.45)
PH SMN: 7.14 [PH] (ref 7.35–7.45)
PH SMN: 7.16 [PH] (ref 7.35–7.45)
PH SMN: 7.17 [PH] (ref 7.35–7.45)
PH SMN: 7.17 [PH] (ref 7.35–7.45)
PH SMN: 7.18 [PH] (ref 7.35–7.45)
PH SMN: 7.22 [PH] (ref 7.35–7.45)
PH SMN: 7.24 [PH] (ref 7.35–7.45)
PH SMN: 7.25 [PH] (ref 7.35–7.45)
PH SMN: 7.26 [PH] (ref 7.35–7.45)
PH SMN: 7.26 [PH] (ref 7.35–7.45)
PH SMN: 7.27 [PH] (ref 7.35–7.45)
PH SMN: 7.28 [PH] (ref 7.35–7.45)
PH SMN: 7.29 [PH] (ref 7.35–7.45)
PH SMN: 7.3 [PH] (ref 7.35–7.45)
PH SMN: 7.31 [PH] (ref 7.35–7.45)
PH SMN: 7.32 [PH] (ref 7.35–7.45)
PH SMN: 7.33 [PH] (ref 7.35–7.45)
PH SMN: 7.33 [PH] (ref 7.35–7.45)
PH SMN: 7.34 [PH] (ref 7.35–7.45)
PH SMN: 7.34 [PH] (ref 7.35–7.45)
PH SMN: 7.35 [PH] (ref 7.35–7.45)
PH SMN: 7.36 [PH] (ref 7.35–7.45)
PH SMN: 7.36 [PH] (ref 7.35–7.45)
PH SMN: 7.37 [PH] (ref 7.35–7.45)
PH SMN: 7.38 [PH] (ref 7.35–7.45)
PH SMN: 7.39 [PH] (ref 7.35–7.45)
PH SMN: 7.4 [PH] (ref 7.35–7.45)
PH SMN: 7.42 [PH] (ref 7.35–7.45)
PH SMN: 7.42 [PH] (ref 7.35–7.45)
PH UR STRIP: 5 [PH] (ref 5–8)
PHOSPHATE SERPL-MCNC: 2.4 MG/DL (ref 2.7–4.5)
PHOSPHATE SERPL-MCNC: 2.4 MG/DL (ref 2.7–4.5)
PHOSPHATE SERPL-MCNC: 2.6 MG/DL (ref 2.7–4.5)
PHOSPHATE SERPL-MCNC: 2.8 MG/DL (ref 2.7–4.5)
PHOSPHATE SERPL-MCNC: 2.8 MG/DL (ref 2.7–4.5)
PHOSPHATE SERPL-MCNC: 2.9 MG/DL (ref 2.7–4.5)
PHOSPHATE SERPL-MCNC: 2.9 MG/DL (ref 2.7–4.5)
PHOSPHATE SERPL-MCNC: 3 MG/DL (ref 2.7–4.5)
PHOSPHATE SERPL-MCNC: 3 MG/DL (ref 2.7–4.5)
PHOSPHATE SERPL-MCNC: 3.1 MG/DL (ref 2.7–4.5)
PHOSPHATE SERPL-MCNC: 3.1 MG/DL (ref 2.7–4.5)
PHOSPHATE SERPL-MCNC: 3.2 MG/DL (ref 2.7–4.5)
PHOSPHATE SERPL-MCNC: 3.4 MG/DL (ref 2.7–4.5)
PHOSPHATE SERPL-MCNC: 3.5 MG/DL (ref 2.7–4.5)
PHOSPHATE SERPL-MCNC: 3.8 MG/DL (ref 2.7–4.5)
PHOSPHATE SERPL-MCNC: 4.1 MG/DL (ref 2.7–4.5)
PHOSPHATE SERPL-MCNC: 4.2 MG/DL (ref 2.7–4.5)
PHOSPHATE SERPL-MCNC: 4.2 MG/DL (ref 2.7–4.5)
PHOSPHATE SERPL-MCNC: 4.6 MG/DL (ref 2.7–4.5)
PHOSPHATE SERPL-MCNC: 4.7 MG/DL (ref 2.7–4.5)
PHOSPHATE SERPL-MCNC: 4.7 MG/DL (ref 2.7–4.5)
PHOSPHATE SERPL-MCNC: 4.8 MG/DL (ref 2.7–4.5)
PHOSPHATE SERPL-MCNC: 5.3 MG/DL (ref 2.7–4.5)
PHOSPHATE SERPL-MCNC: 5.4 MG/DL (ref 2.7–4.5)
PHOSPHATE SERPL-MCNC: 5.4 MG/DL (ref 2.7–4.5)
PHOSPHATE SERPL-MCNC: 7 MG/DL (ref 2.7–4.5)
PHOSPHATE SERPL-MCNC: 7 MG/DL (ref 2.7–4.5)
PHOSPHATE SERPL-MCNC: 7.3 MG/DL (ref 2.7–4.5)
PHOSPHATE SERPL-MCNC: 8 MG/DL (ref 2.7–4.5)
PHOSPHATE SERPL-MCNC: 8.1 MG/DL (ref 2.7–4.5)
PHOSPHATE SERPL-MCNC: 8.4 MG/DL (ref 2.7–4.5)
PIP: 21
PIP: 22
PIP: 25
PIP: 26
PIP: 31
PIP: 32
PIP: 33
PIP: 36
PIP: 36
PISA TR MAX VEL: 1.97 M/S
PLATELET # BLD AUTO: 102 K/UL (ref 150–450)
PLATELET # BLD AUTO: 102 K/UL (ref 150–450)
PLATELET # BLD AUTO: 112 K/UL (ref 150–450)
PLATELET # BLD AUTO: 117 K/UL (ref 150–450)
PLATELET # BLD AUTO: 117 K/UL (ref 150–450)
PLATELET # BLD AUTO: 132 K/UL (ref 150–450)
PLATELET # BLD AUTO: 136 K/UL (ref 150–450)
PLATELET # BLD AUTO: 148 K/UL (ref 150–450)
PLATELET # BLD AUTO: 239 K/UL (ref 150–450)
PLATELET # BLD AUTO: 271 K/UL (ref 150–450)
PLATELET # BLD AUTO: 272 K/UL (ref 150–450)
PLATELET # BLD AUTO: 319 K/UL (ref 150–450)
PLATELET # BLD AUTO: 69 K/UL (ref 150–450)
PLATELET # BLD AUTO: 69 K/UL (ref 150–450)
PLATELET # BLD AUTO: 78 K/UL (ref 150–450)
PLATELET # BLD AUTO: 82 K/UL (ref 150–450)
PLATELET # BLD AUTO: 86 K/UL (ref 150–450)
PLATELET # BLD AUTO: 87 K/UL (ref 150–450)
PLATELET # BLD AUTO: 87 K/UL (ref 150–450)
PLATELET # BLD AUTO: 88 K/UL (ref 150–450)
PLATELET # BLD AUTO: 89 K/UL (ref 150–450)
PLATELET # BLD AUTO: 91 K/UL (ref 150–450)
PLATELET # BLD AUTO: 92 K/UL (ref 150–450)
PLATELET # BLD AUTO: 94 K/UL (ref 150–450)
PLATELET # BLD AUTO: 94 K/UL (ref 150–450)
PLATELET # BLD AUTO: 95 K/UL (ref 150–450)
PLATELET BLD QL SMEAR: ABNORMAL
PMV BLD AUTO: 10.1 FL (ref 9.2–12.9)
PMV BLD AUTO: 10.4 FL (ref 9.2–12.9)
PMV BLD AUTO: 10.9 FL (ref 9.2–12.9)
PMV BLD AUTO: 10.9 FL (ref 9.2–12.9)
PMV BLD AUTO: 11 FL (ref 9.2–12.9)
PMV BLD AUTO: 11 FL (ref 9.2–12.9)
PMV BLD AUTO: 11.1 FL (ref 9.2–12.9)
PMV BLD AUTO: 11.1 FL (ref 9.2–12.9)
PMV BLD AUTO: 11.2 FL (ref 9.2–12.9)
PMV BLD AUTO: 11.2 FL (ref 9.2–12.9)
PMV BLD AUTO: 11.3 FL (ref 9.2–12.9)
PMV BLD AUTO: 11.4 FL (ref 9.2–12.9)
PMV BLD AUTO: 11.6 FL (ref 9.2–12.9)
PMV BLD AUTO: 11.6 FL (ref 9.2–12.9)
PMV BLD AUTO: 11.7 FL (ref 9.2–12.9)
PMV BLD AUTO: 11.9 FL (ref 9.2–12.9)
PMV BLD AUTO: 12 FL (ref 9.2–12.9)
PMV BLD AUTO: 12 FL (ref 9.2–12.9)
PMV BLD AUTO: 12.1 FL (ref 9.2–12.9)
PMV BLD AUTO: 12.1 FL (ref 9.2–12.9)
PMV BLD AUTO: 12.2 FL (ref 9.2–12.9)
PMV BLD AUTO: 12.2 FL (ref 9.2–12.9)
PMV BLD AUTO: 12.3 FL (ref 9.2–12.9)
PMV BLD AUTO: 12.4 FL (ref 9.2–12.9)
PMV BLD AUTO: 12.4 FL (ref 9.2–12.9)
PMV BLD AUTO: 12.5 FL (ref 9.2–12.9)
PO2 BLDA: 101 MMHG (ref 80–100)
PO2 BLDA: 102 MMHG (ref 80–100)
PO2 BLDA: 103 MMHG (ref 80–100)
PO2 BLDA: 105 MMHG (ref 80–100)
PO2 BLDA: 105 MMHG (ref 80–100)
PO2 BLDA: 109 MMHG (ref 40–60)
PO2 BLDA: 109 MMHG (ref 80–100)
PO2 BLDA: 121 MMHG (ref 80–100)
PO2 BLDA: 130 MMHG (ref 80–100)
PO2 BLDA: 146 MMHG (ref 80–100)
PO2 BLDA: 169 MMHG (ref 80–100)
PO2 BLDA: 293 MMHG (ref 80–100)
PO2 BLDA: 353 MMHG (ref 80–100)
PO2 BLDA: 36 MMHG (ref 40–60)
PO2 BLDA: 43 MMHG (ref 80–100)
PO2 BLDA: 48 MMHG (ref 75–100)
PO2 BLDA: 50 MMHG (ref 80–100)
PO2 BLDA: 53 MMHG (ref 80–100)
PO2 BLDA: 53 MMHG (ref 80–100)
PO2 BLDA: 54 MMHG (ref 80–100)
PO2 BLDA: 55 MMHG (ref 80–100)
PO2 BLDA: 57 MMHG (ref 80–100)
PO2 BLDA: 58 MMHG (ref 80–100)
PO2 BLDA: 59 MMHG (ref 80–100)
PO2 BLDA: 59 MMHG (ref 80–100)
PO2 BLDA: 61 MMHG (ref 80–100)
PO2 BLDA: 64 MMHG (ref 80–100)
PO2 BLDA: 69 MMHG (ref 80–100)
PO2 BLDA: 71 MMHG (ref 80–100)
PO2 BLDA: 72 MMHG (ref 80–100)
PO2 BLDA: 72 MMHG (ref 80–100)
PO2 BLDA: 73 MMHG (ref 80–100)
PO2 BLDA: 75 MMHG (ref 80–100)
PO2 BLDA: 75 MMHG (ref 80–100)
PO2 BLDA: 76 MMHG (ref 80–100)
PO2 BLDA: 76 MMHG (ref 80–100)
PO2 BLDA: 77 MMHG (ref 80–100)
PO2 BLDA: 77 MMHG (ref 80–100)
PO2 BLDA: 83 MMHG (ref 80–100)
PO2 BLDA: 84 MMHG (ref 80–100)
PO2 BLDA: 85 MMHG (ref 80–100)
PO2 BLDA: 85 MMHG (ref 80–100)
PO2 BLDA: 86 MMHG (ref 80–100)
PO2 BLDA: 87 MMHG (ref 80–100)
PO2 BLDA: 88 MMHG (ref 80–100)
PO2 BLDA: 90 MMHG (ref 80–100)
PO2 BLDA: 91 MMHG (ref 40–60)
PO2 BLDA: 91 MMHG (ref 80–100)
PO2 BLDA: 93 MMHG (ref 80–100)
PO2 BLDA: 93 MMHG (ref 80–100)
PO2 BLDA: 94 MMHG (ref 80–100)
PO2 BLDA: 95 MMHG (ref 80–100)
POC BE: -1 MMOL/L
POC BE: -1 MMOL/L
POC BE: -2 MMOL/L
POC BE: -3 MMOL/L
POC BE: -4 MMOL/L
POC BE: -5 MMOL/L
POC BE: -6 MMOL/L
POC BE: -7.9 MMOL/L (ref -2–2)
POC BE: 0 MMOL/L
POC BE: 1 MMOL/L
POC BE: 10 MMOL/L
POC BE: 12 MMOL/L
POC BE: 2 MMOL/L
POC BE: 3 MMOL/L
POC BE: 3 MMOL/L
POC BE: 4 MMOL/L
POC BE: 5 MMOL/L
POC BE: 6 MMOL/L
POC BE: 7 MMOL/L
POC BE: 8 MMOL/L
POC BE: 9 MMOL/L
POC COHB: 2.4 % (ref 0–3)
POC IONIZED CALCIUM: 1.21 MMOL/L (ref 1.06–1.42)
POC METHB: 0.9 % (ref 0–1.5)
POC O2HB ARTERIAL: 81.4 % (ref 94–100)
POC SATURATED O2: 100 % (ref 95–100)
POC SATURATED O2: 100 % (ref 95–100)
POC SATURATED O2: 66 % (ref 95–100)
POC SATURATED O2: 70 % (ref 95–100)
POC SATURATED O2: 77 % (ref 95–100)
POC SATURATED O2: 80 % (ref 95–100)
POC SATURATED O2: 82 % (ref 95–100)
POC SATURATED O2: 82 % (ref 95–100)
POC SATURATED O2: 83 % (ref 95–100)
POC SATURATED O2: 84 % (ref 95–100)
POC SATURATED O2: 84.2 % (ref 90–100)
POC SATURATED O2: 85 % (ref 95–100)
POC SATURATED O2: 85 % (ref 95–100)
POC SATURATED O2: 87 % (ref 95–100)
POC SATURATED O2: 88 % (ref 95–100)
POC SATURATED O2: 89 % (ref 95–100)
POC SATURATED O2: 89 % (ref 95–100)
POC SATURATED O2: 90 % (ref 95–100)
POC SATURATED O2: 91 % (ref 95–100)
POC SATURATED O2: 91 % (ref 95–100)
POC SATURATED O2: 92 % (ref 95–100)
POC SATURATED O2: 92 % (ref 95–100)
POC SATURATED O2: 93 % (ref 95–100)
POC SATURATED O2: 94 % (ref 95–100)
POC SATURATED O2: 95 % (ref 95–100)
POC SATURATED O2: 96 % (ref 95–100)
POC SATURATED O2: 97 % (ref 95–100)
POC SATURATED O2: 98 % (ref 95–100)
POC SATURATED O2: 98 % (ref 95–100)
POC SATURATED O2: 99 % (ref 95–100)
POC TCO2: 15.6 MMOL/L
POC TCO2: 21 MMOL/L (ref 23–27)
POC TCO2: 21 MMOL/L (ref 24–29)
POC TCO2: 22 MMOL/L (ref 23–27)
POC TCO2: 23 MMOL/L (ref 23–27)
POC TCO2: 23 MMOL/L (ref 23–27)
POC TCO2: 23 MMOL/L (ref 24–29)
POC TCO2: 23 MMOL/L (ref 24–29)
POC TCO2: 24 MMOL/L (ref 23–27)
POC TCO2: 25 MMOL/L (ref 23–27)
POC TCO2: 26 MMOL/L (ref 23–27)
POC TCO2: 27 MMOL/L (ref 23–27)
POC TCO2: 28 MMOL/L (ref 23–27)
POC TCO2: 29 MMOL/L (ref 23–27)
POC TCO2: 30 MMOL/L (ref 23–27)
POC TCO2: 31 MMOL/L (ref 23–27)
POC TCO2: 32 MMOL/L (ref 23–27)
POC TCO2: 33 MMOL/L (ref 23–27)
POC TCO2: 34 MMOL/L (ref 23–27)
POC TCO2: 35 MMOL/L (ref 23–27)
POC TCO2: 36 MMOL/L (ref 23–27)
POC TCO2: 39 MMOL/L (ref 23–27)
POC TCO2: 40 MMOL/L (ref 23–27)
POC THB: 11.5 G/DL (ref 12–18)
POCT GLUCOSE: 100 MG/DL (ref 70–110)
POCT GLUCOSE: 101 MG/DL (ref 70–110)
POCT GLUCOSE: 102 MG/DL (ref 70–110)
POCT GLUCOSE: 107 MG/DL (ref 70–110)
POCT GLUCOSE: 110 MG/DL (ref 70–110)
POCT GLUCOSE: 111 MG/DL (ref 70–110)
POCT GLUCOSE: 113 MG/DL (ref 70–110)
POCT GLUCOSE: 114 MG/DL (ref 70–110)
POCT GLUCOSE: 118 MG/DL (ref 70–110)
POCT GLUCOSE: 122 MG/DL (ref 70–110)
POCT GLUCOSE: 124 MG/DL (ref 70–110)
POCT GLUCOSE: 125 MG/DL (ref 70–110)
POCT GLUCOSE: 125 MG/DL (ref 70–110)
POCT GLUCOSE: 133 MG/DL (ref 70–110)
POCT GLUCOSE: 134 MG/DL (ref 70–110)
POCT GLUCOSE: 134 MG/DL (ref 70–110)
POCT GLUCOSE: 135 MG/DL (ref 70–110)
POCT GLUCOSE: 136 MG/DL (ref 70–110)
POCT GLUCOSE: 137 MG/DL (ref 70–110)
POCT GLUCOSE: 138 MG/DL (ref 70–110)
POCT GLUCOSE: 140 MG/DL (ref 70–110)
POCT GLUCOSE: 141 MG/DL (ref 70–110)
POCT GLUCOSE: 142 MG/DL (ref 70–110)
POCT GLUCOSE: 142 MG/DL (ref 70–110)
POCT GLUCOSE: 144 MG/DL (ref 70–110)
POCT GLUCOSE: 146 MG/DL (ref 70–110)
POCT GLUCOSE: 147 MG/DL (ref 70–110)
POCT GLUCOSE: 148 MG/DL (ref 70–110)
POCT GLUCOSE: 148 MG/DL (ref 70–110)
POCT GLUCOSE: 149 MG/DL (ref 70–110)
POCT GLUCOSE: 150 MG/DL (ref 70–110)
POCT GLUCOSE: 151 MG/DL (ref 70–110)
POCT GLUCOSE: 152 MG/DL (ref 70–110)
POCT GLUCOSE: 153 MG/DL (ref 70–110)
POCT GLUCOSE: 154 MG/DL (ref 70–110)
POCT GLUCOSE: 154 MG/DL (ref 70–110)
POCT GLUCOSE: 155 MG/DL (ref 70–110)
POCT GLUCOSE: 156 MG/DL (ref 70–110)
POCT GLUCOSE: 157 MG/DL (ref 70–110)
POCT GLUCOSE: 158 MG/DL (ref 70–110)
POCT GLUCOSE: 160 MG/DL (ref 70–110)
POCT GLUCOSE: 161 MG/DL (ref 70–110)
POCT GLUCOSE: 163 MG/DL (ref 70–110)
POCT GLUCOSE: 167 MG/DL (ref 70–110)
POCT GLUCOSE: 167 MG/DL (ref 70–110)
POCT GLUCOSE: 168 MG/DL (ref 70–110)
POCT GLUCOSE: 168 MG/DL (ref 70–110)
POCT GLUCOSE: 170 MG/DL (ref 70–110)
POCT GLUCOSE: 171 MG/DL (ref 70–110)
POCT GLUCOSE: 173 MG/DL (ref 70–110)
POCT GLUCOSE: 174 MG/DL (ref 70–110)
POCT GLUCOSE: 174 MG/DL (ref 70–110)
POCT GLUCOSE: 177 MG/DL (ref 70–110)
POCT GLUCOSE: 178 MG/DL (ref 70–110)
POCT GLUCOSE: 179 MG/DL (ref 70–110)
POCT GLUCOSE: 179 MG/DL (ref 70–110)
POCT GLUCOSE: 181 MG/DL (ref 70–110)
POCT GLUCOSE: 182 MG/DL (ref 70–110)
POCT GLUCOSE: 183 MG/DL (ref 70–110)
POCT GLUCOSE: 184 MG/DL (ref 70–110)
POCT GLUCOSE: 184 MG/DL (ref 70–110)
POCT GLUCOSE: 185 MG/DL (ref 70–110)
POCT GLUCOSE: 186 MG/DL (ref 70–110)
POCT GLUCOSE: 187 MG/DL (ref 70–110)
POCT GLUCOSE: 188 MG/DL (ref 70–110)
POCT GLUCOSE: 189 MG/DL (ref 70–110)
POCT GLUCOSE: 189 MG/DL (ref 70–110)
POCT GLUCOSE: 191 MG/DL (ref 70–110)
POCT GLUCOSE: 193 MG/DL (ref 70–110)
POCT GLUCOSE: 194 MG/DL (ref 70–110)
POCT GLUCOSE: 197 MG/DL (ref 70–110)
POCT GLUCOSE: 197 MG/DL (ref 70–110)
POCT GLUCOSE: 198 MG/DL (ref 70–110)
POCT GLUCOSE: 198 MG/DL (ref 70–110)
POCT GLUCOSE: 199 MG/DL (ref 70–110)
POCT GLUCOSE: 201 MG/DL (ref 70–110)
POCT GLUCOSE: 201 MG/DL (ref 70–110)
POCT GLUCOSE: 202 MG/DL (ref 70–110)
POCT GLUCOSE: 203 MG/DL (ref 70–110)
POCT GLUCOSE: 204 MG/DL (ref 70–110)
POCT GLUCOSE: 205 MG/DL (ref 70–110)
POCT GLUCOSE: 210 MG/DL (ref 70–110)
POCT GLUCOSE: 213 MG/DL (ref 70–110)
POCT GLUCOSE: 213 MG/DL (ref 70–110)
POCT GLUCOSE: 215 MG/DL (ref 70–110)
POCT GLUCOSE: 217 MG/DL (ref 70–110)
POCT GLUCOSE: 217 MG/DL (ref 70–110)
POCT GLUCOSE: 218 MG/DL (ref 70–110)
POCT GLUCOSE: 219 MG/DL (ref 70–110)
POCT GLUCOSE: 221 MG/DL (ref 70–110)
POCT GLUCOSE: 224 MG/DL (ref 70–110)
POCT GLUCOSE: 225 MG/DL (ref 70–110)
POCT GLUCOSE: 227 MG/DL (ref 70–110)
POCT GLUCOSE: 227 MG/DL (ref 70–110)
POCT GLUCOSE: 228 MG/DL (ref 70–110)
POCT GLUCOSE: 229 MG/DL (ref 70–110)
POCT GLUCOSE: 229 MG/DL (ref 70–110)
POCT GLUCOSE: 230 MG/DL (ref 70–110)
POCT GLUCOSE: 230 MG/DL (ref 70–110)
POCT GLUCOSE: 231 MG/DL (ref 70–110)
POCT GLUCOSE: 231 MG/DL (ref 70–110)
POCT GLUCOSE: 233 MG/DL (ref 70–110)
POCT GLUCOSE: 235 MG/DL (ref 70–110)
POCT GLUCOSE: 235 MG/DL (ref 70–110)
POCT GLUCOSE: 237 MG/DL (ref 70–110)
POCT GLUCOSE: 237 MG/DL (ref 70–110)
POCT GLUCOSE: 238 MG/DL (ref 70–110)
POCT GLUCOSE: 240 MG/DL (ref 70–110)
POCT GLUCOSE: 241 MG/DL (ref 70–110)
POCT GLUCOSE: 242 MG/DL (ref 70–110)
POCT GLUCOSE: 243 MG/DL (ref 70–110)
POCT GLUCOSE: 244 MG/DL (ref 70–110)
POCT GLUCOSE: 245 MG/DL (ref 70–110)
POCT GLUCOSE: 245 MG/DL (ref 70–110)
POCT GLUCOSE: 246 MG/DL (ref 70–110)
POCT GLUCOSE: 248 MG/DL (ref 70–110)
POCT GLUCOSE: 249 MG/DL (ref 70–110)
POCT GLUCOSE: 251 MG/DL (ref 70–110)
POCT GLUCOSE: 252 MG/DL (ref 70–110)
POCT GLUCOSE: 253 MG/DL (ref 70–110)
POCT GLUCOSE: 254 MG/DL (ref 70–110)
POCT GLUCOSE: 257 MG/DL (ref 70–110)
POCT GLUCOSE: 258 MG/DL (ref 70–110)
POCT GLUCOSE: 260 MG/DL (ref 70–110)
POCT GLUCOSE: 261 MG/DL (ref 70–110)
POCT GLUCOSE: 268 MG/DL (ref 70–110)
POCT GLUCOSE: 272 MG/DL (ref 70–110)
POCT GLUCOSE: 276 MG/DL (ref 70–110)
POCT GLUCOSE: 277 MG/DL (ref 70–110)
POCT GLUCOSE: 294 MG/DL (ref 70–110)
POCT GLUCOSE: 294 MG/DL (ref 70–110)
POCT GLUCOSE: 301 MG/DL (ref 70–110)
POCT GLUCOSE: 304 MG/DL (ref 70–110)
POCT GLUCOSE: 305 MG/DL (ref 70–110)
POCT GLUCOSE: 312 MG/DL (ref 70–110)
POCT GLUCOSE: 312 MG/DL (ref 70–110)
POCT GLUCOSE: 337 MG/DL (ref 70–110)
POCT GLUCOSE: 339 MG/DL (ref 70–110)
POCT GLUCOSE: 344 MG/DL (ref 70–110)
POCT GLUCOSE: 350 MG/DL (ref 70–110)
POCT GLUCOSE: 351 MG/DL (ref 70–110)
POCT GLUCOSE: 355 MG/DL (ref 70–110)
POCT GLUCOSE: 362 MG/DL (ref 70–110)
POCT GLUCOSE: 367 MG/DL (ref 70–110)
POCT GLUCOSE: 381 MG/DL (ref 70–110)
POCT GLUCOSE: 393 MG/DL (ref 70–110)
POCT GLUCOSE: 406 MG/DL (ref 70–110)
POCT GLUCOSE: 408 MG/DL (ref 70–110)
POCT GLUCOSE: 420 MG/DL (ref 70–110)
POCT GLUCOSE: 441 MG/DL (ref 70–110)
POCT GLUCOSE: 455 MG/DL (ref 70–110)
POCT GLUCOSE: 455 MG/DL (ref 70–110)
POCT GLUCOSE: 458 MG/DL (ref 70–110)
POCT GLUCOSE: 475 MG/DL (ref 70–110)
POCT GLUCOSE: 483 MG/DL (ref 70–110)
POCT GLUCOSE: 486 MG/DL (ref 70–110)
POCT GLUCOSE: 55 MG/DL (ref 70–110)
POCT GLUCOSE: 83 MG/DL (ref 70–110)
POCT GLUCOSE: 85 MG/DL (ref 70–110)
POCT GLUCOSE: 90 MG/DL (ref 70–110)
POCT GLUCOSE: 91 MG/DL (ref 70–110)
POIKILOCYTOSIS BLD QL SMEAR: SLIGHT
POLYCHROMASIA BLD QL SMEAR: ABNORMAL
POTASSIUM BLD-SCNC: 5.5 MMOL/L (ref 3.5–5.1)
POTASSIUM SERPL-SCNC: 3.1 MMOL/L (ref 3.5–5.1)
POTASSIUM SERPL-SCNC: 3.1 MMOL/L (ref 3.5–5.1)
POTASSIUM SERPL-SCNC: 3.2 MMOL/L (ref 3.5–5.1)
POTASSIUM SERPL-SCNC: 3.5 MMOL/L (ref 3.5–5.1)
POTASSIUM SERPL-SCNC: 3.9 MMOL/L (ref 3.5–5.1)
POTASSIUM SERPL-SCNC: 3.9 MMOL/L (ref 3.5–5.1)
POTASSIUM SERPL-SCNC: 4 MMOL/L (ref 3.5–5.1)
POTASSIUM SERPL-SCNC: 4.1 MMOL/L (ref 3.5–5.1)
POTASSIUM SERPL-SCNC: 4.2 MMOL/L (ref 3.5–5.1)
POTASSIUM SERPL-SCNC: 4.2 MMOL/L (ref 3.5–5.1)
POTASSIUM SERPL-SCNC: 4.3 MMOL/L (ref 3.5–5.1)
POTASSIUM SERPL-SCNC: 4.4 MMOL/L (ref 3.5–5.1)
POTASSIUM SERPL-SCNC: 4.5 MMOL/L (ref 3.5–5.1)
POTASSIUM SERPL-SCNC: 4.6 MMOL/L (ref 3.5–5.1)
POTASSIUM SERPL-SCNC: 4.6 MMOL/L (ref 3.5–5.1)
POTASSIUM SERPL-SCNC: 4.7 MMOL/L (ref 3.5–5.1)
POTASSIUM SERPL-SCNC: 4.8 MMOL/L (ref 3.5–5.1)
POTASSIUM SERPL-SCNC: 4.9 MMOL/L (ref 3.5–5.1)
POTASSIUM SERPL-SCNC: 5 MMOL/L (ref 3.5–5.1)
POTASSIUM SERPL-SCNC: 5.1 MMOL/L (ref 3.5–5.1)
POTASSIUM SERPL-SCNC: 5.2 MMOL/L (ref 3.5–5.1)
POTASSIUM SERPL-SCNC: 5.3 MMOL/L (ref 3.5–5.1)
POTASSIUM SERPL-SCNC: 5.4 MMOL/L (ref 3.5–5.1)
POTASSIUM SERPL-SCNC: 5.5 MMOL/L (ref 3.5–5.1)
POTASSIUM SERPL-SCNC: 5.6 MMOL/L (ref 3.5–5.1)
POTASSIUM SERPL-SCNC: 5.6 MMOL/L (ref 3.5–5.1)
POTASSIUM SERPL-SCNC: 5.7 MMOL/L (ref 3.5–5.1)
POTASSIUM SERPL-SCNC: 5.7 MMOL/L (ref 3.5–5.1)
POTASSIUM SERPL-SCNC: 5.8 MMOL/L (ref 3.5–5.1)
POTASSIUM SERPL-SCNC: 5.9 MMOL/L (ref 3.5–5.1)
POTASSIUM SERPL-SCNC: 6 MMOL/L (ref 3.5–5.1)
POTASSIUM SERPL-SCNC: 6 MMOL/L (ref 3.5–5.1)
POTASSIUM SERPL-SCNC: 6.1 MMOL/L (ref 3.5–5.1)
POTASSIUM SERPL-SCNC: 6.2 MMOL/L (ref 3.5–5.1)
POTASSIUM SERPL-SCNC: 6.5 MMOL/L (ref 3.5–5.1)
POTASSIUM SERPL-SCNC: 6.8 MMOL/L (ref 3.5–5.1)
POTASSIUM SERPL-SCNC: 6.8 MMOL/L (ref 3.5–5.1)
PROCALCITONIN SERPL IA-MCNC: 0.73 NG/ML
PROCALCITONIN SERPL IA-MCNC: 0.79 NG/ML
PROMYELOCYTES NFR BLD MANUAL: 0.7 %
PROMYELOCYTES NFR BLD MANUAL: 0.7 %
PROMYELOCYTES NFR BLD MANUAL: 2 %
PROMYELOCYTES NFR BLD MANUAL: 2.5 %
PROT SERPL-MCNC: 5.4 G/DL (ref 6–8.4)
PROT SERPL-MCNC: 5.5 G/DL (ref 6–8.4)
PROT SERPL-MCNC: 5.6 G/DL (ref 6–8.4)
PROT SERPL-MCNC: 5.6 G/DL (ref 6–8.4)
PROT SERPL-MCNC: 5.7 G/DL (ref 6–8.4)
PROT SERPL-MCNC: 5.7 G/DL (ref 6–8.4)
PROT SERPL-MCNC: 5.8 G/DL (ref 6–8.4)
PROT SERPL-MCNC: 5.9 G/DL (ref 6–8.4)
PROT SERPL-MCNC: 5.9 G/DL (ref 6–8.4)
PROT SERPL-MCNC: 6 G/DL (ref 6–8.4)
PROT SERPL-MCNC: 6.2 G/DL (ref 6–8.4)
PROT SERPL-MCNC: 6.2 G/DL (ref 6–8.4)
PROT SERPL-MCNC: 6.3 G/DL (ref 6–8.4)
PROT SERPL-MCNC: 6.3 G/DL (ref 6–8.4)
PROT SERPL-MCNC: 6.4 G/DL (ref 6–8.4)
PROT SERPL-MCNC: 6.5 G/DL (ref 6–8.4)
PROT SERPL-MCNC: 6.7 G/DL (ref 6–8.4)
PROT SERPL-MCNC: 7 G/DL (ref 6–8.4)
PROT SERPL-MCNC: 7 G/DL (ref 6–8.4)
PROT SERPL-MCNC: 7.1 G/DL (ref 6–8.4)
PROT SERPL-MCNC: 7.2 G/DL (ref 6–8.4)
PROT UR QL STRIP: NEGATIVE
PROTHROMBIN TIME: 10.9 SEC (ref 9–12.5)
PROTHROMBIN TIME: 11.4 SEC (ref 9–12.5)
PROTHROMBIN TIME: 11.6 SEC (ref 9–12.5)
PROTHROMBIN TIME: 11.8 SEC (ref 9–12.5)
PROTHROMBIN TIME: 11.9 SEC (ref 9–12.5)
PROVIDER CREDENTIALS: ABNORMAL
PROVIDER NOTIFIED: ABNORMAL
PULM VEIN S/D RATIO: 1.25
PV PEAK D VEL: 0.28 M/S
PV PEAK S VEL: 0.35 M/S
RA MAJOR: 5.03 CM
RA PRESSURE: 3 MMHG
RA WIDTH: 2.98 CM
RBC # BLD AUTO: 2.47 M/UL (ref 4.6–6.2)
RBC # BLD AUTO: 2.58 M/UL (ref 4.6–6.2)
RBC # BLD AUTO: 2.59 M/UL (ref 4.6–6.2)
RBC # BLD AUTO: 2.63 M/UL (ref 4.6–6.2)
RBC # BLD AUTO: 2.66 M/UL (ref 4.6–6.2)
RBC # BLD AUTO: 2.66 M/UL (ref 4.6–6.2)
RBC # BLD AUTO: 2.69 M/UL (ref 4.6–6.2)
RBC # BLD AUTO: 2.74 M/UL (ref 4.6–6.2)
RBC # BLD AUTO: 2.77 M/UL (ref 4.6–6.2)
RBC # BLD AUTO: 2.83 M/UL (ref 4.6–6.2)
RBC # BLD AUTO: 2.87 M/UL (ref 4.6–6.2)
RBC # BLD AUTO: 2.96 M/UL (ref 4.6–6.2)
RBC # BLD AUTO: 3.05 M/UL (ref 4.6–6.2)
RBC # BLD AUTO: 3.07 M/UL (ref 4.6–6.2)
RBC # BLD AUTO: 3.19 M/UL (ref 4.6–6.2)
RBC # BLD AUTO: 3.39 M/UL (ref 4.6–6.2)
RBC # BLD AUTO: 3.66 M/UL (ref 4.6–6.2)
RBC # BLD AUTO: 3.76 M/UL (ref 4.6–6.2)
RBC # BLD AUTO: 3.82 M/UL (ref 4.6–6.2)
RBC # BLD AUTO: 3.9 M/UL (ref 4.6–6.2)
RBC # BLD AUTO: 4 M/UL (ref 4.6–6.2)
RBC # BLD AUTO: 4.02 M/UL (ref 4.6–6.2)
RBC # BLD AUTO: 4.25 M/UL (ref 4.6–6.2)
RBC # BLD AUTO: 4.36 M/UL (ref 4.6–6.2)
RBC #/AREA URNS AUTO: 1 /HPF (ref 0–4)
RIGHT VENTRICULAR END-DIASTOLIC DIMENSION: 4.08 CM
RV TISSUE DOPPLER FREE WALL SYSTOLIC VELOCITY 1 (APICAL 4 CHAMBER VIEW): 13.29 CM/S
SAMPLE: ABNORMAL
SCHISTOCYTES BLD QL SMEAR: PRESENT
SCHISTOCYTES BLD QL SMEAR: PRESENT
SINUS: 4.7 CM
SITE: ABNORMAL
SMUDGE CELLS BLD QL SMEAR: PRESENT
SODIUM BLD-SCNC: 132 MMOL/L (ref 136–145)
SODIUM SERPL-SCNC: 120 MMOL/L (ref 136–145)
SODIUM SERPL-SCNC: 121 MMOL/L (ref 136–145)
SODIUM SERPL-SCNC: 121 MMOL/L (ref 136–145)
SODIUM SERPL-SCNC: 122 MMOL/L (ref 136–145)
SODIUM SERPL-SCNC: 122 MMOL/L (ref 136–145)
SODIUM SERPL-SCNC: 124 MMOL/L (ref 136–145)
SODIUM SERPL-SCNC: 124 MMOL/L (ref 136–145)
SODIUM SERPL-SCNC: 125 MMOL/L (ref 136–145)
SODIUM SERPL-SCNC: 125 MMOL/L (ref 136–145)
SODIUM SERPL-SCNC: 126 MMOL/L (ref 136–145)
SODIUM SERPL-SCNC: 127 MMOL/L (ref 136–145)
SODIUM SERPL-SCNC: 127 MMOL/L (ref 136–145)
SODIUM SERPL-SCNC: 128 MMOL/L (ref 136–145)
SODIUM SERPL-SCNC: 128 MMOL/L (ref 136–145)
SODIUM SERPL-SCNC: 129 MMOL/L (ref 136–145)
SODIUM SERPL-SCNC: 130 MMOL/L (ref 136–145)
SODIUM SERPL-SCNC: 131 MMOL/L (ref 136–145)
SODIUM SERPL-SCNC: 132 MMOL/L (ref 136–145)
SODIUM SERPL-SCNC: 133 MMOL/L (ref 136–145)
SODIUM SERPL-SCNC: 134 MMOL/L (ref 136–145)
SODIUM SERPL-SCNC: 134 MMOL/L (ref 136–145)
SODIUM SERPL-SCNC: 135 MMOL/L (ref 136–145)
SODIUM SERPL-SCNC: 136 MMOL/L (ref 136–145)
SODIUM SERPL-SCNC: 137 MMOL/L (ref 136–145)
SODIUM SERPL-SCNC: 138 MMOL/L (ref 136–145)
SODIUM SERPL-SCNC: 139 MMOL/L (ref 136–145)
SODIUM SERPL-SCNC: 140 MMOL/L (ref 136–145)
SODIUM SERPL-SCNC: 141 MMOL/L (ref 136–145)
SODIUM SERPL-SCNC: 142 MMOL/L (ref 136–145)
SODIUM SERPL-SCNC: 143 MMOL/L (ref 136–145)
SODIUM SERPL-SCNC: 144 MMOL/L (ref 136–145)
SODIUM SERPL-SCNC: 145 MMOL/L (ref 136–145)
SP GR UR STRIP: 1.01 (ref 1–1.03)
SP02: 100
SP02: 70
SP02: 80
SP02: 89
SP02: 90
SP02: 90
SP02: 91
SP02: 92
SP02: 93
SP02: 95
SP02: 98
SP02: 99
SPONT RATE: 19
SPONT RATE: 35
SQUAMOUS #/AREA URNS AUTO: 1 /HPF
STJ: 3.15 CM
T4 FREE SERPL-MCNC: 0.48 NG/DL (ref 0.71–1.51)
TACROLIMUS BLD-MCNC: 2.1 NG/ML (ref 5–15)
TACROLIMUS BLD-MCNC: 2.3 NG/ML (ref 5–15)
TACROLIMUS BLD-MCNC: 2.4 NG/ML (ref 5–15)
TACROLIMUS BLD-MCNC: 2.5 NG/ML (ref 5–15)
TACROLIMUS BLD-MCNC: 2.6 NG/ML (ref 5–15)
TACROLIMUS BLD-MCNC: 2.8 NG/ML (ref 5–15)
TACROLIMUS BLD-MCNC: 3 NG/ML (ref 5–15)
TACROLIMUS BLD-MCNC: 3.3 NG/ML (ref 5–15)
TACROLIMUS BLD-MCNC: 3.7 NG/ML (ref 5–15)
TACROLIMUS BLD-MCNC: 3.8 NG/ML (ref 5–15)
TACROLIMUS BLD-MCNC: 4.1 NG/ML (ref 5–15)
TACROLIMUS BLD-MCNC: 4.4 NG/ML (ref 5–15)
TACROLIMUS BLD-MCNC: 4.4 NG/ML (ref 5–15)
TACROLIMUS BLD-MCNC: 5.1 NG/ML (ref 5–15)
TACROLIMUS BLD-MCNC: 5.6 NG/ML (ref 5–15)
TACROLIMUS BLD-MCNC: 5.9 NG/ML (ref 5–15)
TACROLIMUS BLD-MCNC: <2 NG/ML (ref 5–15)
TACROLIMUS, NORMALIZED: 1.8 NG/ML (ref 5–15)
TACROLIMUS, NORMALIZED: 2 NG/ML (ref 5–15)
TACROLIMUS, NORMALIZED: 2.1 NG/ML (ref 5–15)
TACROLIMUS, NORMALIZED: 2.2 NG/ML (ref 5–15)
TACROLIMUS, NORMALIZED: 2.3 NG/ML (ref 5–15)
TACROLIMUS, NORMALIZED: 2.4 NG/ML (ref 5–15)
TACROLIMUS, NORMALIZED: 2.6 NG/ML (ref 5–15)
TACROLIMUS, NORMALIZED: 2.9 NG/ML (ref 5–15)
TACROLIMUS, NORMALIZED: 3.3 NG/ML (ref 5–15)
TACROLIMUS, NORMALIZED: 3.4 NG/ML (ref 5–15)
TACROLIMUS, NORMALIZED: 3.7 NG/ML (ref 5–15)
TACROLIMUS, NORMALIZED: 4 NG/ML (ref 5–15)
TACROLIMUS, NORMALIZED: 4 NG/ML (ref 5–15)
TACROLIMUS, NORMALIZED: 4.7 NG/ML (ref 5–15)
TACROLIMUS, NORMALIZED: 5.1 NG/ML (ref 5–15)
TACROLIMUS, NORMALIZED: 5.4 NG/ML (ref 5–15)
TACROLIMUS, NORMALIZED: <2 NG/ML (ref 5–15)
TDI LATERAL: 0.06 M/S
TDI SEPTAL: 0.05 M/S
TDI: 0.06 M/S
TIME NOTIFIED: 1417
TIME NOTIFIED: 1421
TIME NOTIFIED: 815
TOXIC GRANULES BLD QL SMEAR: PRESENT
TR MAX PG: 16 MMHG
TRANS ERYTHROCYTES VOL PATIENT: NORMAL ML
TRICUSPID ANNULAR PLANE SYSTOLIC EXCURSION: 2.01 CM
TRIGL SERPL-MCNC: 129 MG/DL (ref 30–150)
TRIGL SERPL-MCNC: 346 MG/DL (ref 30–150)
TRIGL SERPL-MCNC: 466 MG/DL (ref 30–150)
TROPONIN I SERPL DL<=0.01 NG/ML-MCNC: 0.03 NG/ML (ref 0–0.03)
TROPONIN I SERPL DL<=0.01 NG/ML-MCNC: 0.04 NG/ML (ref 0–0.03)
TSH SERPL DL<=0.005 MIU/L-ACNC: 1.63 UIU/ML (ref 0.4–4)
TV REST PULMONARY ARTERY PRESSURE: 19 MMHG
URN SPEC COLLECT METH UR: ABNORMAL
VANCOMYCIN SERPL-MCNC: 14.5 UG/ML
VERBAL RESULT READBACK PERFORMED: YES
VT: 400
VT: 420
VT: 430
VT: 434
VT: 440
VT: 450
VT: 462
VT: 462
VT: 480
VT: 480
VT: 509
VT: 517
VT: 555
WBC # BLD AUTO: 10.37 K/UL (ref 3.9–12.7)
WBC # BLD AUTO: 10.43 K/UL (ref 3.9–12.7)
WBC # BLD AUTO: 12.42 K/UL (ref 3.9–12.7)
WBC # BLD AUTO: 12.62 K/UL (ref 3.9–12.7)
WBC # BLD AUTO: 17.75 K/UL (ref 3.9–12.7)
WBC # BLD AUTO: 18.11 K/UL (ref 3.9–12.7)
WBC # BLD AUTO: 19.96 K/UL (ref 3.9–12.7)
WBC # BLD AUTO: 2.76 K/UL (ref 3.9–12.7)
WBC # BLD AUTO: 2.93 K/UL (ref 3.9–12.7)
WBC # BLD AUTO: 27.83 K/UL (ref 3.9–12.7)
WBC # BLD AUTO: 3.66 K/UL (ref 3.9–12.7)
WBC # BLD AUTO: 3.89 K/UL (ref 3.9–12.7)
WBC # BLD AUTO: 33.12 K/UL (ref 3.9–12.7)
WBC # BLD AUTO: 4.39 K/UL (ref 3.9–12.7)
WBC # BLD AUTO: 4.5 K/UL (ref 3.9–12.7)
WBC # BLD AUTO: 4.67 K/UL (ref 3.9–12.7)
WBC # BLD AUTO: 4.7 K/UL (ref 3.9–12.7)
WBC # BLD AUTO: 4.84 K/UL (ref 3.9–12.7)
WBC # BLD AUTO: 5.12 K/UL (ref 3.9–12.7)
WBC # BLD AUTO: 5.29 K/UL (ref 3.9–12.7)
WBC # BLD AUTO: 5.85 K/UL (ref 3.9–12.7)
WBC # BLD AUTO: 6.21 K/UL (ref 3.9–12.7)
WBC # BLD AUTO: 6.68 K/UL (ref 3.9–12.7)
WBC # BLD AUTO: 7.5 K/UL (ref 3.9–12.7)
WBC # BLD AUTO: 8.13 K/UL (ref 3.9–12.7)
WBC # BLD AUTO: 9.71 K/UL (ref 3.9–12.7)
WBC #/AREA URNS AUTO: 6 /HPF (ref 0–5)
YEAST UR QL AUTO: ABNORMAL

## 2021-01-01 PROCEDURE — 63600175 PHARM REV CODE 636 W HCPCS: Performed by: INTERNAL MEDICINE

## 2021-01-01 PROCEDURE — 31500 INTUBATION: ICD-10-PCS | Mod: CR,,, | Performed by: INTERNAL MEDICINE

## 2021-01-01 PROCEDURE — 85610 PROTHROMBIN TIME: CPT | Performed by: STUDENT IN AN ORGANIZED HEALTH CARE EDUCATION/TRAINING PROGRAM

## 2021-01-01 PROCEDURE — 63600175 PHARM REV CODE 636 W HCPCS: Performed by: STUDENT IN AN ORGANIZED HEALTH CARE EDUCATION/TRAINING PROGRAM

## 2021-01-01 PROCEDURE — 93005 ELECTROCARDIOGRAM TRACING: CPT

## 2021-01-01 PROCEDURE — 90945 DIALYSIS ONE EVALUATION: CPT

## 2021-01-01 PROCEDURE — P9016 RBC LEUKOCYTES REDUCED: HCPCS | Performed by: INTERNAL MEDICINE

## 2021-01-01 PROCEDURE — 25000003 PHARM REV CODE 250: Performed by: PHYSICIAN ASSISTANT

## 2021-01-01 PROCEDURE — 36620 INSERTION CATHETER ARTERY: CPT | Mod: 59,CR,, | Performed by: PHYSICIAN ASSISTANT

## 2021-01-01 PROCEDURE — 25000003 PHARM REV CODE 250: Performed by: INTERNAL MEDICINE

## 2021-01-01 PROCEDURE — 94003 VENT MGMT INPAT SUBQ DAY: CPT

## 2021-01-01 PROCEDURE — 37799 UNLISTED PX VASCULAR SURGERY: CPT

## 2021-01-01 PROCEDURE — 93010 RHYTHM STRIP: ICD-10-PCS | Mod: ,,, | Performed by: INTERNAL MEDICINE

## 2021-01-01 PROCEDURE — 85007 BL SMEAR W/DIFF WBC COUNT: CPT | Performed by: INTERNAL MEDICINE

## 2021-01-01 PROCEDURE — 99291 CRITICAL CARE FIRST HOUR: CPT | Mod: CR,,, | Performed by: INTERNAL MEDICINE

## 2021-01-01 PROCEDURE — 94640 AIRWAY INHALATION TREATMENT: CPT

## 2021-01-01 PROCEDURE — 36415 COLL VENOUS BLD VENIPUNCTURE: CPT | Performed by: INTERNAL MEDICINE

## 2021-01-01 PROCEDURE — 86480 TB TEST CELL IMMUN MEASURE: CPT | Performed by: INTERNAL MEDICINE

## 2021-01-01 PROCEDURE — 80197 ASSAY OF TACROLIMUS: CPT | Performed by: PHYSICIAN ASSISTANT

## 2021-01-01 PROCEDURE — 94761 N-INVAS EAR/PLS OXIMETRY MLT: CPT

## 2021-01-01 PROCEDURE — 80053 COMPREHEN METABOLIC PANEL: CPT | Mod: 91 | Performed by: NURSE PRACTITIONER

## 2021-01-01 PROCEDURE — 80053 COMPREHEN METABOLIC PANEL: CPT | Performed by: NURSE PRACTITIONER

## 2021-01-01 PROCEDURE — 94660 CPAP INITIATION&MGMT: CPT

## 2021-01-01 PROCEDURE — 36620 ARTERIAL LINE: ICD-10-PCS | Mod: 59,CR,, | Performed by: PHYSICIAN ASSISTANT

## 2021-01-01 PROCEDURE — 84439 ASSAY OF FREE THYROXINE: CPT | Performed by: INTERNAL MEDICINE

## 2021-01-01 PROCEDURE — 27000207 HC ISOLATION

## 2021-01-01 PROCEDURE — 80197 ASSAY OF TACROLIMUS: CPT | Performed by: INTERNAL MEDICINE

## 2021-01-01 PROCEDURE — 83735 ASSAY OF MAGNESIUM: CPT | Performed by: NURSE PRACTITIONER

## 2021-01-01 PROCEDURE — 82803 BLOOD GASES ANY COMBINATION: CPT

## 2021-01-01 PROCEDURE — 81001 URINALYSIS AUTO W/SCOPE: CPT | Performed by: NURSE PRACTITIONER

## 2021-01-01 PROCEDURE — 27000221 HC OXYGEN, UP TO 24 HOURS

## 2021-01-01 PROCEDURE — 80202 ASSAY OF VANCOMYCIN: CPT | Performed by: STUDENT IN AN ORGANIZED HEALTH CARE EDUCATION/TRAINING PROGRAM

## 2021-01-01 PROCEDURE — 99900035 HC TECH TIME PER 15 MIN (STAT)

## 2021-01-01 PROCEDURE — 84145 PROCALCITONIN (PCT): CPT | Performed by: SURGERY

## 2021-01-01 PROCEDURE — 84478 ASSAY OF TRIGLYCERIDES: CPT | Performed by: INTERNAL MEDICINE

## 2021-01-01 PROCEDURE — 84100 ASSAY OF PHOSPHORUS: CPT | Performed by: NURSE PRACTITIONER

## 2021-01-01 PROCEDURE — 99900026 HC AIRWAY MAINTENANCE (STAT)

## 2021-01-01 PROCEDURE — 25000003 PHARM REV CODE 250: Performed by: SURGERY

## 2021-01-01 PROCEDURE — 85730 THROMBOPLASTIN TIME PARTIAL: CPT | Mod: 91 | Performed by: INTERNAL MEDICINE

## 2021-01-01 PROCEDURE — 63600175 PHARM REV CODE 636 W HCPCS: Performed by: NURSE PRACTITIONER

## 2021-01-01 PROCEDURE — 36556 INSERT NON-TUNNEL CV CATH: CPT

## 2021-01-01 PROCEDURE — 25000242 PHARM REV CODE 250 ALT 637 W/ HCPCS: Performed by: INTERNAL MEDICINE

## 2021-01-01 PROCEDURE — 99291 CRITICAL CARE FIRST HOUR: CPT | Mod: 25,CR,, | Performed by: INTERNAL MEDICINE

## 2021-01-01 PROCEDURE — 31500 INSERT EMERGENCY AIRWAY: CPT | Mod: CR,,, | Performed by: INTERNAL MEDICINE

## 2021-01-01 PROCEDURE — 99291 PR CRITICAL CARE, E/M 30-74 MINUTES: ICD-10-PCS | Mod: CR,,, | Performed by: INTERNAL MEDICINE

## 2021-01-01 PROCEDURE — 82728 ASSAY OF FERRITIN: CPT | Performed by: PHYSICIAN ASSISTANT

## 2021-01-01 PROCEDURE — 25000003 PHARM REV CODE 250

## 2021-01-01 PROCEDURE — 85027 COMPLETE CBC AUTOMATED: CPT | Performed by: INTERNAL MEDICINE

## 2021-01-01 PROCEDURE — 80053 COMPREHEN METABOLIC PANEL: CPT | Performed by: SURGERY

## 2021-01-01 PROCEDURE — 20000000 HC ICU ROOM

## 2021-01-01 PROCEDURE — 99233 PR SUBSEQUENT HOSPITAL CARE,LEVL III: ICD-10-PCS | Mod: ,,, | Performed by: NURSE PRACTITIONER

## 2021-01-01 PROCEDURE — 32556 PR PLR DRN W/CATH PL W/O IMG: ICD-10-PCS | Mod: RT,,, | Performed by: EMERGENCY MEDICINE

## 2021-01-01 PROCEDURE — 85379 FIBRIN DEGRADATION QUANT: CPT | Performed by: PHYSICIAN ASSISTANT

## 2021-01-01 PROCEDURE — 63600175 PHARM REV CODE 636 W HCPCS: Performed by: PHYSICIAN ASSISTANT

## 2021-01-01 PROCEDURE — 93010 EKG 12-LEAD: ICD-10-PCS | Mod: ,,, | Performed by: INTERNAL MEDICINE

## 2021-01-01 PROCEDURE — 27200966 HC CLOSED SUCTION SYSTEM

## 2021-01-01 PROCEDURE — 99291 PR CRITICAL CARE, E/M 30-74 MINUTES: ICD-10-PCS | Mod: ,,, | Performed by: INTERNAL MEDICINE

## 2021-01-01 PROCEDURE — 80069 RENAL FUNCTION PANEL: CPT | Performed by: NURSE PRACTITIONER

## 2021-01-01 PROCEDURE — 80069 RENAL FUNCTION PANEL: CPT | Performed by: INTERNAL MEDICINE

## 2021-01-01 PROCEDURE — 80069 RENAL FUNCTION PANEL: CPT | Mod: 91 | Performed by: NURSE PRACTITIONER

## 2021-01-01 PROCEDURE — 83735 ASSAY OF MAGNESIUM: CPT | Mod: 91 | Performed by: INTERNAL MEDICINE

## 2021-01-01 PROCEDURE — C9399 UNCLASSIFIED DRUGS OR BIOLOG: HCPCS | Performed by: PHYSICIAN ASSISTANT

## 2021-01-01 PROCEDURE — 85025 COMPLETE CBC W/AUTO DIFF WBC: CPT | Performed by: INTERNAL MEDICINE

## 2021-01-01 PROCEDURE — 86900 BLOOD TYPING SEROLOGIC ABO: CPT | Performed by: INTERNAL MEDICINE

## 2021-01-01 PROCEDURE — 82553 CREATINE MB FRACTION: CPT | Performed by: SURGERY

## 2021-01-01 PROCEDURE — 25000003 PHARM REV CODE 250: Performed by: EMERGENCY MEDICINE

## 2021-01-01 PROCEDURE — 85379 FIBRIN DEGRADATION QUANT: CPT | Performed by: SURGERY

## 2021-01-01 PROCEDURE — 87070 CULTURE OTHR SPECIMN AEROBIC: CPT | Performed by: STUDENT IN AN ORGANIZED HEALTH CARE EDUCATION/TRAINING PROGRAM

## 2021-01-01 PROCEDURE — P9021 RED BLOOD CELLS UNIT: HCPCS | Performed by: STUDENT IN AN ORGANIZED HEALTH CARE EDUCATION/TRAINING PROGRAM

## 2021-01-01 PROCEDURE — 85730 THROMBOPLASTIN TIME PARTIAL: CPT | Performed by: INTERNAL MEDICINE

## 2021-01-01 PROCEDURE — 25000003 PHARM REV CODE 250: Performed by: NURSE PRACTITIONER

## 2021-01-01 PROCEDURE — 86140 C-REACTIVE PROTEIN: CPT | Performed by: INTERNAL MEDICINE

## 2021-01-01 PROCEDURE — 85027 COMPLETE CBC AUTOMATED: CPT | Mod: 91 | Performed by: STUDENT IN AN ORGANIZED HEALTH CARE EDUCATION/TRAINING PROGRAM

## 2021-01-01 PROCEDURE — 99233 SBSQ HOSP IP/OBS HIGH 50: CPT | Mod: ,,, | Performed by: NURSE PRACTITIONER

## 2021-01-01 PROCEDURE — 96367 TX/PROPH/DG ADDL SEQ IV INF: CPT | Mod: 59

## 2021-01-01 PROCEDURE — 96375 TX/PRO/DX INJ NEW DRUG ADDON: CPT | Mod: 59

## 2021-01-01 PROCEDURE — 36415 COLL VENOUS BLD VENIPUNCTURE: CPT | Performed by: STUDENT IN AN ORGANIZED HEALTH CARE EDUCATION/TRAINING PROGRAM

## 2021-01-01 PROCEDURE — 85025 COMPLETE CBC W/AUTO DIFF WBC: CPT | Mod: 91 | Performed by: INTERNAL MEDICINE

## 2021-01-01 PROCEDURE — C9399 UNCLASSIFIED DRUGS OR BIOLOG: HCPCS | Performed by: INTERNAL MEDICINE

## 2021-01-01 PROCEDURE — 84132 ASSAY OF SERUM POTASSIUM: CPT | Performed by: INTERNAL MEDICINE

## 2021-01-01 PROCEDURE — 83615 LACTATE (LD) (LDH) ENZYME: CPT | Performed by: PHYSICIAN ASSISTANT

## 2021-01-01 PROCEDURE — 86920 COMPATIBILITY TEST SPIN: CPT | Performed by: STUDENT IN AN ORGANIZED HEALTH CARE EDUCATION/TRAINING PROGRAM

## 2021-01-01 PROCEDURE — 82803 BLOOD GASES ANY COMBINATION: CPT | Performed by: SURGERY

## 2021-01-01 PROCEDURE — 27000190 HC CPAP FULL FACE MASK W/VALVE

## 2021-01-01 PROCEDURE — 99285 EMERGENCY DEPT VISIT HI MDM: CPT | Mod: 25

## 2021-01-01 PROCEDURE — 99233 SBSQ HOSP IP/OBS HIGH 50: CPT | Mod: CR,,, | Performed by: NURSE PRACTITIONER

## 2021-01-01 PROCEDURE — 93010 ELECTROCARDIOGRAM REPORT: CPT | Mod: ,,, | Performed by: INTERNAL MEDICINE

## 2021-01-01 PROCEDURE — 85610 PROTHROMBIN TIME: CPT | Performed by: INTERNAL MEDICINE

## 2021-01-01 PROCEDURE — 83735 ASSAY OF MAGNESIUM: CPT | Mod: 91 | Performed by: STUDENT IN AN ORGANIZED HEALTH CARE EDUCATION/TRAINING PROGRAM

## 2021-01-01 PROCEDURE — 85025 COMPLETE CBC W/AUTO DIFF WBC: CPT | Performed by: SURGERY

## 2021-01-01 PROCEDURE — 99291 CRITICAL CARE FIRST HOUR: CPT | Mod: ,,, | Performed by: STUDENT IN AN ORGANIZED HEALTH CARE EDUCATION/TRAINING PROGRAM

## 2021-01-01 PROCEDURE — 99232 SBSQ HOSP IP/OBS MODERATE 35: CPT | Mod: CR,,, | Performed by: INTERNAL MEDICINE

## 2021-01-01 PROCEDURE — 80048 BASIC METABOLIC PNL TOTAL CA: CPT | Performed by: PHYSICIAN ASSISTANT

## 2021-01-01 PROCEDURE — 80053 COMPREHEN METABOLIC PANEL: CPT | Mod: 91 | Performed by: STUDENT IN AN ORGANIZED HEALTH CARE EDUCATION/TRAINING PROGRAM

## 2021-01-01 PROCEDURE — 83880 ASSAY OF NATRIURETIC PEPTIDE: CPT | Performed by: SURGERY

## 2021-01-01 PROCEDURE — 83615 LACTATE (LD) (LDH) ENZYME: CPT | Mod: 91 | Performed by: PHYSICIAN ASSISTANT

## 2021-01-01 PROCEDURE — 80162 ASSAY OF DIGOXIN TOTAL: CPT | Performed by: INTERNAL MEDICINE

## 2021-01-01 PROCEDURE — 27000200 HC HIGH FLOW DEL DISP CIRCUIT

## 2021-01-01 PROCEDURE — 82800 BLOOD PH: CPT

## 2021-01-01 PROCEDURE — 96365 THER/PROPH/DIAG IV INF INIT: CPT | Mod: 59

## 2021-01-01 PROCEDURE — 85007 BL SMEAR W/DIFF WBC COUNT: CPT | Mod: 91 | Performed by: STUDENT IN AN ORGANIZED HEALTH CARE EDUCATION/TRAINING PROGRAM

## 2021-01-01 PROCEDURE — 80053 COMPREHEN METABOLIC PANEL: CPT | Mod: 91 | Performed by: INTERNAL MEDICINE

## 2021-01-01 PROCEDURE — 25000003 PHARM REV CODE 250: Performed by: STUDENT IN AN ORGANIZED HEALTH CARE EDUCATION/TRAINING PROGRAM

## 2021-01-01 PROCEDURE — 80048 BASIC METABOLIC PNL TOTAL CA: CPT | Mod: 91 | Performed by: INTERNAL MEDICINE

## 2021-01-01 PROCEDURE — 82728 ASSAY OF FERRITIN: CPT | Performed by: SURGERY

## 2021-01-01 PROCEDURE — 83605 ASSAY OF LACTIC ACID: CPT | Performed by: INTERNAL MEDICINE

## 2021-01-01 PROCEDURE — 99233 PR SUBSEQUENT HOSPITAL CARE,LEVL III: ICD-10-PCS | Mod: CR,,, | Performed by: NURSE PRACTITIONER

## 2021-01-01 PROCEDURE — 85730 THROMBOPLASTIN TIME PARTIAL: CPT | Mod: 91 | Performed by: STUDENT IN AN ORGANIZED HEALTH CARE EDUCATION/TRAINING PROGRAM

## 2021-01-01 PROCEDURE — 87340 HEPATITIS B SURFACE AG IA: CPT | Performed by: NURSE PRACTITIONER

## 2021-01-01 PROCEDURE — 36430 TRANSFUSION BLD/BLD COMPNT: CPT

## 2021-01-01 PROCEDURE — 80100008 HC CRRT DAILY MAINTENANCE

## 2021-01-01 PROCEDURE — 85384 FIBRINOGEN ACTIVITY: CPT | Performed by: STUDENT IN AN ORGANIZED HEALTH CARE EDUCATION/TRAINING PROGRAM

## 2021-01-01 PROCEDURE — 80048 BASIC METABOLIC PNL TOTAL CA: CPT | Mod: 91 | Performed by: PHYSICIAN ASSISTANT

## 2021-01-01 PROCEDURE — 99223 PR INITIAL HOSPITAL CARE,LEVL III: ICD-10-PCS | Mod: ,,, | Performed by: NURSE PRACTITIONER

## 2021-01-01 PROCEDURE — 36556 PR INSERT NON-TUNNEL CV CATH 5+ YRS OLD: ICD-10-PCS | Mod: 59,CR,, | Performed by: PHYSICIAN ASSISTANT

## 2021-01-01 PROCEDURE — 99232 SBSQ HOSP IP/OBS MODERATE 35: CPT | Mod: CR,,, | Performed by: NURSE PRACTITIONER

## 2021-01-01 PROCEDURE — 99291 CRITICAL CARE FIRST HOUR: CPT | Mod: CR,,, | Performed by: PHYSICIAN ASSISTANT

## 2021-01-01 PROCEDURE — 84145 PROCALCITONIN (PCT): CPT | Mod: 91 | Performed by: INTERNAL MEDICINE

## 2021-01-01 PROCEDURE — 80069 RENAL FUNCTION PANEL: CPT | Mod: 91 | Performed by: INTERNAL MEDICINE

## 2021-01-01 PROCEDURE — 99232 PR SUBSEQUENT HOSPITAL CARE,LEVL II: ICD-10-PCS | Mod: CR,,, | Performed by: NURSE PRACTITIONER

## 2021-01-01 PROCEDURE — 80048 BASIC METABOLIC PNL TOTAL CA: CPT | Performed by: INTERNAL MEDICINE

## 2021-01-01 PROCEDURE — 27201236 HC CRRT SET UP & CANCELED

## 2021-01-01 PROCEDURE — 86900 BLOOD TYPING SEROLOGIC ABO: CPT | Performed by: STUDENT IN AN ORGANIZED HEALTH CARE EDUCATION/TRAINING PROGRAM

## 2021-01-01 PROCEDURE — 99291 CRITICAL CARE FIRST HOUR: CPT | Mod: ,,, | Performed by: INTERNAL MEDICINE

## 2021-01-01 PROCEDURE — 82010 KETONE BODYS QUAN: CPT | Performed by: SURGERY

## 2021-01-01 PROCEDURE — 84484 ASSAY OF TROPONIN QUANT: CPT | Mod: 91 | Performed by: NURSE PRACTITIONER

## 2021-01-01 PROCEDURE — 99223 1ST HOSP IP/OBS HIGH 75: CPT | Mod: ,,, | Performed by: NURSE PRACTITIONER

## 2021-01-01 PROCEDURE — 85610 PROTHROMBIN TIME: CPT | Performed by: SURGERY

## 2021-01-01 PROCEDURE — 63600175 PHARM REV CODE 636 W HCPCS

## 2021-01-01 PROCEDURE — 83036 HEMOGLOBIN GLYCOSYLATED A1C: CPT | Performed by: INTERNAL MEDICINE

## 2021-01-01 PROCEDURE — 27100171 HC OXYGEN HIGH FLOW UP TO 24 HOURS

## 2021-01-01 PROCEDURE — 99232 PR SUBSEQUENT HOSPITAL CARE,LEVL II: ICD-10-PCS | Mod: ,,, | Performed by: NURSE PRACTITIONER

## 2021-01-01 PROCEDURE — 99291 PR CRITICAL CARE, E/M 30-74 MINUTES: ICD-10-PCS | Mod: 25,CR,, | Performed by: INTERNAL MEDICINE

## 2021-01-01 PROCEDURE — 85610 PROTHROMBIN TIME: CPT | Mod: 91 | Performed by: STUDENT IN AN ORGANIZED HEALTH CARE EDUCATION/TRAINING PROGRAM

## 2021-01-01 PROCEDURE — 80048 BASIC METABOLIC PNL TOTAL CA: CPT | Performed by: STUDENT IN AN ORGANIZED HEALTH CARE EDUCATION/TRAINING PROGRAM

## 2021-01-01 PROCEDURE — S0166 INJ OLANZAPINE 2.5MG: HCPCS | Performed by: STUDENT IN AN ORGANIZED HEALTH CARE EDUCATION/TRAINING PROGRAM

## 2021-01-01 PROCEDURE — 99232 PR SUBSEQUENT HOSPITAL CARE,LEVL II: ICD-10-PCS | Mod: 25,,, | Performed by: NURSE PRACTITIONER

## 2021-01-01 PROCEDURE — 87040 BLOOD CULTURE FOR BACTERIA: CPT | Performed by: SURGERY

## 2021-01-01 PROCEDURE — 85651 RBC SED RATE NONAUTOMATED: CPT | Performed by: SURGERY

## 2021-01-01 PROCEDURE — 85652 RBC SED RATE AUTOMATED: CPT | Performed by: NURSE PRACTITIONER

## 2021-01-01 PROCEDURE — 90945 PR DIALYSIS, NOT HEMO, 1 EVAL: ICD-10-PCS | Mod: ,,, | Performed by: NURSE PRACTITIONER

## 2021-01-01 PROCEDURE — 84100 ASSAY OF PHOSPHORUS: CPT | Mod: 91 | Performed by: STUDENT IN AN ORGANIZED HEALTH CARE EDUCATION/TRAINING PROGRAM

## 2021-01-01 PROCEDURE — 85730 THROMBOPLASTIN TIME PARTIAL: CPT | Performed by: SURGERY

## 2021-01-01 PROCEDURE — 86704 HEP B CORE ANTIBODY TOTAL: CPT | Performed by: NURSE PRACTITIONER

## 2021-01-01 PROCEDURE — 99233 SBSQ HOSP IP/OBS HIGH 50: CPT | Mod: ,,, | Performed by: PHYSICIAN ASSISTANT

## 2021-01-01 PROCEDURE — 87040 BLOOD CULTURE FOR BACTERIA: CPT | Mod: 59 | Performed by: PHYSICIAN ASSISTANT

## 2021-01-01 PROCEDURE — 93005 ELECTROCARDIOGRAM TRACING: CPT | Mod: 59

## 2021-01-01 PROCEDURE — 99232 PR SUBSEQUENT HOSPITAL CARE,LEVL II: ICD-10-PCS | Mod: CR,,, | Performed by: INTERNAL MEDICINE

## 2021-01-01 PROCEDURE — 82550 ASSAY OF CK (CPK): CPT | Performed by: INTERNAL MEDICINE

## 2021-01-01 PROCEDURE — 86140 C-REACTIVE PROTEIN: CPT | Performed by: SURGERY

## 2021-01-01 PROCEDURE — C9399 UNCLASSIFIED DRUGS OR BIOLOG: HCPCS | Performed by: NURSE PRACTITIONER

## 2021-01-01 PROCEDURE — 96361 HYDRATE IV INFUSION ADD-ON: CPT

## 2021-01-01 PROCEDURE — 99232 SBSQ HOSP IP/OBS MODERATE 35: CPT | Mod: 25,,, | Performed by: NURSE PRACTITIONER

## 2021-01-01 PROCEDURE — 99291 PR CRITICAL CARE, E/M 30-74 MINUTES: ICD-10-PCS | Mod: ,,, | Performed by: STUDENT IN AN ORGANIZED HEALTH CARE EDUCATION/TRAINING PROGRAM

## 2021-01-01 PROCEDURE — 36415 COLL VENOUS BLD VENIPUNCTURE: CPT | Performed by: SURGERY

## 2021-01-01 PROCEDURE — 90945 PR DIALYSIS, NOT HEMO, 1 EVAL: ICD-10-PCS | Mod: CR,,, | Performed by: HOSPITALIST

## 2021-01-01 PROCEDURE — 83615 LACTATE (LD) (LDH) ENZYME: CPT | Performed by: SURGERY

## 2021-01-01 PROCEDURE — 83880 ASSAY OF NATRIURETIC PEPTIDE: CPT | Mod: 91 | Performed by: NURSE PRACTITIONER

## 2021-01-01 PROCEDURE — 83735 ASSAY OF MAGNESIUM: CPT | Mod: 91 | Performed by: NURSE PRACTITIONER

## 2021-01-01 PROCEDURE — 87205 SMEAR GRAM STAIN: CPT | Performed by: STUDENT IN AN ORGANIZED HEALTH CARE EDUCATION/TRAINING PROGRAM

## 2021-01-01 PROCEDURE — 99291 PR CRITICAL CARE, E/M 30-74 MINUTES: ICD-10-PCS | Mod: CR,,, | Performed by: PHYSICIAN ASSISTANT

## 2021-01-01 PROCEDURE — 87040 BLOOD CULTURE FOR BACTERIA: CPT | Mod: 59 | Performed by: INTERNAL MEDICINE

## 2021-01-01 PROCEDURE — 84484 ASSAY OF TROPONIN QUANT: CPT | Performed by: SURGERY

## 2021-01-01 PROCEDURE — 99292 PR CRITICAL CARE, ADDL 30 MIN: ICD-10-PCS | Mod: ,,, | Performed by: STUDENT IN AN ORGANIZED HEALTH CARE EDUCATION/TRAINING PROGRAM

## 2021-01-01 PROCEDURE — 83605 ASSAY OF LACTIC ACID: CPT | Performed by: SURGERY

## 2021-01-01 PROCEDURE — 63600175 PHARM REV CODE 636 W HCPCS: Performed by: SURGERY

## 2021-01-01 PROCEDURE — 36600 WITHDRAWAL OF ARTERIAL BLOOD: CPT | Mod: 59

## 2021-01-01 PROCEDURE — 99232 SBSQ HOSP IP/OBS MODERATE 35: CPT | Mod: ,,, | Performed by: NURSE PRACTITIONER

## 2021-01-01 PROCEDURE — 99233 PR SUBSEQUENT HOSPITAL CARE,LEVL III: ICD-10-PCS | Mod: ,,, | Performed by: PHYSICIAN ASSISTANT

## 2021-01-01 PROCEDURE — 32556 INSERT CATH PLEURA W/O IMAGE: CPT | Mod: RT,,, | Performed by: EMERGENCY MEDICINE

## 2021-01-01 PROCEDURE — 90945 DIALYSIS ONE EVALUATION: CPT | Mod: CR,,, | Performed by: HOSPITALIST

## 2021-01-01 PROCEDURE — 84443 ASSAY THYROID STIM HORMONE: CPT | Performed by: INTERNAL MEDICINE

## 2021-01-01 PROCEDURE — 36556 INSERT NON-TUNNEL CV CATH: CPT | Mod: 59,CR,, | Performed by: PHYSICIAN ASSISTANT

## 2021-01-01 PROCEDURE — 90945 DIALYSIS ONE EVALUATION: CPT | Mod: ,,, | Performed by: NURSE PRACTITIONER

## 2021-01-01 PROCEDURE — 82140 ASSAY OF AMMONIA: CPT | Performed by: INTERNAL MEDICINE

## 2021-01-01 PROCEDURE — 86920 COMPATIBILITY TEST SPIN: CPT | Performed by: INTERNAL MEDICINE

## 2021-01-01 PROCEDURE — 83880 ASSAY OF NATRIURETIC PEPTIDE: CPT | Performed by: INTERNAL MEDICINE

## 2021-01-01 PROCEDURE — 96372 THER/PROPH/DIAG INJ SC/IM: CPT

## 2021-01-01 PROCEDURE — 36600 WITHDRAWAL OF ARTERIAL BLOOD: CPT

## 2021-01-01 PROCEDURE — 99292 CRITICAL CARE ADDL 30 MIN: CPT | Mod: ,,, | Performed by: STUDENT IN AN ORGANIZED HEALTH CARE EDUCATION/TRAINING PROGRAM

## 2021-01-01 RX ORDER — DEXMEDETOMIDINE HYDROCHLORIDE 4 UG/ML
0-1.4 INJECTION, SOLUTION INTRAVENOUS CONTINUOUS
Status: DISCONTINUED | OUTPATIENT
Start: 2021-01-01 | End: 2021-01-01

## 2021-01-01 RX ORDER — DEXAMETHASONE SODIUM PHOSPHATE 4 MG/ML
6 INJECTION, SOLUTION INTRA-ARTICULAR; INTRALESIONAL; INTRAMUSCULAR; INTRAVENOUS; SOFT TISSUE EVERY 24 HOURS
Status: DISCONTINUED | OUTPATIENT
Start: 2021-01-01 | End: 2021-01-01

## 2021-01-01 RX ORDER — VANCOMYCIN HCL IN 5 % DEXTROSE 1G/250ML
1000 PLASTIC BAG, INJECTION (ML) INTRAVENOUS ONCE
Status: COMPLETED | OUTPATIENT
Start: 2021-01-01 | End: 2021-01-01

## 2021-01-01 RX ORDER — AMIODARONE HYDROCHLORIDE 200 MG/1
200 TABLET ORAL 2 TIMES DAILY
Status: DISCONTINUED | OUTPATIENT
Start: 2021-01-01 | End: 2021-01-01

## 2021-01-01 RX ORDER — SODIUM,POTASSIUM PHOSPHATES 280-250MG
2 POWDER IN PACKET (EA) ORAL
Status: DISCONTINUED | OUTPATIENT
Start: 2021-01-01 | End: 2021-01-01

## 2021-01-01 RX ORDER — DIGOXIN 0.25 MG/ML
125 INJECTION INTRAMUSCULAR; INTRAVENOUS DAILY
Status: DISCONTINUED | OUTPATIENT
Start: 2021-01-01 | End: 2021-01-01 | Stop reason: HOSPADM

## 2021-01-01 RX ORDER — FENTANYL CITRATE 50 UG/ML
100 INJECTION, SOLUTION INTRAMUSCULAR; INTRAVENOUS EVERY 6 HOURS PRN
Status: DISCONTINUED | OUTPATIENT
Start: 2021-01-01 | End: 2021-01-01

## 2021-01-01 RX ORDER — METOPROLOL TARTRATE 50 MG/1
50 TABLET ORAL DAILY
Status: DISCONTINUED | OUTPATIENT
Start: 2021-01-01 | End: 2021-01-01

## 2021-01-01 RX ORDER — SODIUM CHLORIDE 9 MG/ML
INJECTION, SOLUTION INTRAVENOUS
Status: DISCONTINUED | OUTPATIENT
Start: 2021-01-01 | End: 2021-01-01 | Stop reason: HOSPADM

## 2021-01-01 RX ORDER — METOPROLOL TARTRATE 1 MG/ML
INJECTION, SOLUTION INTRAVENOUS
Status: COMPLETED
Start: 2021-01-01 | End: 2021-01-01

## 2021-01-01 RX ORDER — CHOLECALCIFEROL (VITAMIN D3) 25 MCG
1000 TABLET ORAL DAILY
Status: DISCONTINUED | OUTPATIENT
Start: 2021-01-01 | End: 2021-01-01

## 2021-01-01 RX ORDER — INSULIN ASPART 100 [IU]/ML
4 INJECTION, SOLUTION INTRAVENOUS; SUBCUTANEOUS
Status: DISCONTINUED | OUTPATIENT
Start: 2021-01-01 | End: 2021-01-01

## 2021-01-01 RX ORDER — INSULIN ASPART 100 [IU]/ML
0-5 INJECTION, SOLUTION INTRAVENOUS; SUBCUTANEOUS EVERY 4 HOURS PRN
Status: DISCONTINUED | OUTPATIENT
Start: 2021-01-01 | End: 2021-01-01

## 2021-01-01 RX ORDER — INSULIN ASPART 100 [IU]/ML
2 INJECTION, SOLUTION INTRAVENOUS; SUBCUTANEOUS
Status: DISCONTINUED | OUTPATIENT
Start: 2021-01-01 | End: 2021-01-01

## 2021-01-01 RX ORDER — ACETAMINOPHEN 325 MG/1
650 TABLET ORAL EVERY 4 HOURS PRN
Status: DISCONTINUED | OUTPATIENT
Start: 2021-01-01 | End: 2021-01-01 | Stop reason: HOSPADM

## 2021-01-01 RX ORDER — INSULIN ASPART 100 [IU]/ML
2 INJECTION, SOLUTION INTRAVENOUS; SUBCUTANEOUS
Status: DISCONTINUED | OUTPATIENT
Start: 2021-01-01 | End: 2021-01-01 | Stop reason: HOSPADM

## 2021-01-01 RX ORDER — FUROSEMIDE 10 MG/ML
60 INJECTION INTRAMUSCULAR; INTRAVENOUS ONCE
Status: COMPLETED | OUTPATIENT
Start: 2021-01-01 | End: 2021-01-01

## 2021-01-01 RX ORDER — SUCCINYLCHOLINE CHLORIDE 20 MG/ML
INJECTION INTRAMUSCULAR; INTRAVENOUS
Status: DISCONTINUED
Start: 2021-01-01 | End: 2021-01-01 | Stop reason: WASHOUT

## 2021-01-01 RX ORDER — GLUCAGON 1 MG
1 KIT INJECTION
Status: DISCONTINUED | OUTPATIENT
Start: 2021-01-01 | End: 2021-01-01

## 2021-01-01 RX ORDER — METOPROLOL TARTRATE 1 MG/ML
5 INJECTION, SOLUTION INTRAVENOUS
Status: COMPLETED | OUTPATIENT
Start: 2021-01-01 | End: 2021-01-01

## 2021-01-01 RX ORDER — TAMSULOSIN HYDROCHLORIDE 0.4 MG/1
0.4 CAPSULE ORAL NIGHTLY
Status: DISCONTINUED | OUTPATIENT
Start: 2021-01-01 | End: 2021-01-01

## 2021-01-01 RX ORDER — AMOXICILLIN 250 MG
2 CAPSULE ORAL 2 TIMES DAILY
Status: DISCONTINUED | OUTPATIENT
Start: 2021-01-01 | End: 2021-01-01 | Stop reason: HOSPADM

## 2021-01-01 RX ORDER — ENOXAPARIN SODIUM 100 MG/ML
100 INJECTION SUBCUTANEOUS
Status: DISCONTINUED | OUTPATIENT
Start: 2021-01-01 | End: 2021-01-01

## 2021-01-01 RX ORDER — HEPARIN SODIUM 5000 [USP'U]/ML
5000 INJECTION, SOLUTION INTRAVENOUS; SUBCUTANEOUS EVERY 8 HOURS
Status: DISCONTINUED | OUTPATIENT
Start: 2021-01-01 | End: 2021-01-01

## 2021-01-01 RX ORDER — INSULIN ASPART 100 [IU]/ML
10 INJECTION, SOLUTION INTRAVENOUS; SUBCUTANEOUS ONCE
Status: COMPLETED | OUTPATIENT
Start: 2021-01-01 | End: 2021-01-01

## 2021-01-01 RX ORDER — LORAZEPAM 2 MG/ML
1 INJECTION INTRAMUSCULAR EVERY 4 HOURS PRN
Status: DISCONTINUED | OUTPATIENT
Start: 2021-01-01 | End: 2021-01-01

## 2021-01-01 RX ORDER — INSULIN ASPART 100 [IU]/ML
1-10 INJECTION, SOLUTION INTRAVENOUS; SUBCUTANEOUS EVERY 4 HOURS PRN
Status: CANCELLED | OUTPATIENT
Start: 2021-01-01

## 2021-01-01 RX ORDER — MIDAZOLAM HYDROCHLORIDE 1 MG/ML
INJECTION INTRAMUSCULAR; INTRAVENOUS
Status: COMPLETED
Start: 2021-01-01 | End: 2021-01-01

## 2021-01-01 RX ORDER — INSULIN ASPART 100 [IU]/ML
6 INJECTION, SOLUTION INTRAVENOUS; SUBCUTANEOUS
Status: DISCONTINUED | OUTPATIENT
Start: 2021-01-01 | End: 2021-01-01

## 2021-01-01 RX ORDER — ZIPRASIDONE MESYLATE 20 MG/ML
10 INJECTION, POWDER, LYOPHILIZED, FOR SOLUTION INTRAMUSCULAR EVERY 6 HOURS PRN
Status: DISCONTINUED | OUTPATIENT
Start: 2021-01-01 | End: 2021-01-01

## 2021-01-01 RX ORDER — AMIODARONE HYDROCHLORIDE 200 MG/1
200 TABLET ORAL 2 TIMES DAILY
Status: DISCONTINUED | OUTPATIENT
Start: 2021-01-01 | End: 2021-01-01 | Stop reason: HOSPADM

## 2021-01-01 RX ORDER — MIDAZOLAM HYDROCHLORIDE 1 MG/ML
4 INJECTION INTRAMUSCULAR; INTRAVENOUS ONCE
Status: COMPLETED | OUTPATIENT
Start: 2021-01-01 | End: 2021-01-01

## 2021-01-01 RX ORDER — PHENYLEPHRINE HCL IN 0.9% NACL 1 MG/10 ML
SYRINGE (ML) INTRAVENOUS
Status: DISPENSED
Start: 2021-01-01 | End: 2021-01-01

## 2021-01-01 RX ORDER — POTASSIUM CHLORIDE 14.9 MG/ML
20 INJECTION INTRAVENOUS
Status: COMPLETED | OUTPATIENT
Start: 2021-01-01 | End: 2021-01-01

## 2021-01-01 RX ORDER — MIDAZOLAM HYDROCHLORIDE 1 MG/ML
1 INJECTION INTRAMUSCULAR; INTRAVENOUS ONCE
Status: COMPLETED | OUTPATIENT
Start: 2021-01-01 | End: 2021-01-01

## 2021-01-01 RX ORDER — SODIUM,POTASSIUM PHOSPHATES 280-250MG
1 POWDER IN PACKET (EA) ORAL ONCE
Status: DISCONTINUED | OUTPATIENT
Start: 2021-01-01 | End: 2021-01-01

## 2021-01-01 RX ORDER — METOPROLOL TARTRATE 25 MG/1
25 TABLET, FILM COATED ORAL 2 TIMES DAILY
Status: DISCONTINUED | OUTPATIENT
Start: 2021-01-01 | End: 2021-01-01

## 2021-01-01 RX ORDER — DIGOXIN 0.25 MG/ML
500 INJECTION INTRAMUSCULAR; INTRAVENOUS ONCE
Status: COMPLETED | OUTPATIENT
Start: 2021-01-01 | End: 2021-01-01

## 2021-01-01 RX ORDER — INSULIN ASPART 100 [IU]/ML
1-10 INJECTION, SOLUTION INTRAVENOUS; SUBCUTANEOUS EVERY 6 HOURS PRN
Status: DISCONTINUED | OUTPATIENT
Start: 2021-01-01 | End: 2021-01-01

## 2021-01-01 RX ORDER — ETOMIDATE 2 MG/ML
INJECTION INTRAVENOUS
Status: DISPENSED
Start: 2021-01-01 | End: 2021-01-01

## 2021-01-01 RX ORDER — DEXAMETHASONE SODIUM PHOSPHATE 4 MG/ML
6 INJECTION, SOLUTION INTRA-ARTICULAR; INTRALESIONAL; INTRAMUSCULAR; INTRAVENOUS; SOFT TISSUE EVERY 24 HOURS
Status: CANCELLED | OUTPATIENT
Start: 2021-01-01 | End: 2021-01-01

## 2021-01-01 RX ORDER — NICARDIPINE HYDROCHLORIDE 0.2 MG/ML
0-15 INJECTION INTRAVENOUS CONTINUOUS
Status: DISCONTINUED | OUTPATIENT
Start: 2021-01-01 | End: 2021-01-01

## 2021-01-01 RX ORDER — INSULIN ASPART 100 [IU]/ML
1-10 INJECTION, SOLUTION INTRAVENOUS; SUBCUTANEOUS EVERY 4 HOURS PRN
Status: DISCONTINUED | OUTPATIENT
Start: 2021-01-01 | End: 2021-01-01 | Stop reason: HOSPADM

## 2021-01-01 RX ORDER — NOREPINEPHRINE BITARTRATE/D5W 4MG/250ML
PLASTIC BAG, INJECTION (ML) INTRAVENOUS
Status: COMPLETED
Start: 2021-01-01 | End: 2021-01-01

## 2021-01-01 RX ORDER — POTASSIUM CHLORIDE 7.45 MG/ML
10 INJECTION INTRAVENOUS
Status: DISCONTINUED | OUTPATIENT
Start: 2021-01-01 | End: 2021-01-01

## 2021-01-01 RX ORDER — MAGNESIUM SULFATE HEPTAHYDRATE 40 MG/ML
2 INJECTION, SOLUTION INTRAVENOUS
Status: DISCONTINUED | OUTPATIENT
Start: 2021-01-01 | End: 2021-01-01 | Stop reason: HOSPADM

## 2021-01-01 RX ORDER — ATROPINE SULFATE 0.1 MG/ML
INJECTION INTRAVENOUS
Status: DISPENSED
Start: 2021-01-01 | End: 2021-01-01

## 2021-01-01 RX ORDER — PROPOFOL 10 MG/ML
INJECTION, EMULSION INTRAVENOUS
Status: DISCONTINUED
Start: 2021-01-01 | End: 2021-01-01 | Stop reason: WASHOUT

## 2021-01-01 RX ORDER — SODIUM,POTASSIUM PHOSPHATES 280-250MG
2 POWDER IN PACKET (EA) ORAL 3 TIMES DAILY PRN
Status: DISCONTINUED | OUTPATIENT
Start: 2021-01-01 | End: 2021-01-01 | Stop reason: HOSPADM

## 2021-01-01 RX ORDER — MAGNESIUM SULFATE HEPTAHYDRATE 40 MG/ML
2 INJECTION, SOLUTION INTRAVENOUS
Status: DISCONTINUED | OUTPATIENT
Start: 2021-01-01 | End: 2021-01-01

## 2021-01-01 RX ORDER — FENTANYL CITRATE-0.9 % NACL/PF 10 MCG/ML
0-250 PLASTIC BAG, INJECTION (ML) INTRAVENOUS CONTINUOUS
Status: DISCONTINUED | OUTPATIENT
Start: 2021-01-01 | End: 2021-01-01

## 2021-01-01 RX ORDER — POTASSIUM CHLORIDE 14.9 MG/ML
60 INJECTION INTRAVENOUS
Status: DISCONTINUED | OUTPATIENT
Start: 2021-01-01 | End: 2021-01-01

## 2021-01-01 RX ORDER — ASCORBIC ACID 500 MG
500 TABLET ORAL 2 TIMES DAILY
Status: DISCONTINUED | OUTPATIENT
Start: 2021-01-01 | End: 2021-01-01

## 2021-01-01 RX ORDER — ROCURONIUM BROMIDE 10 MG/ML
INJECTION, SOLUTION INTRAVENOUS
Status: COMPLETED
Start: 2021-01-01 | End: 2021-01-01

## 2021-01-01 RX ORDER — DEXTROSE MONOHYDRATE 100 MG/ML
INJECTION, SOLUTION INTRAVENOUS CONTINUOUS PRN
Status: DISCONTINUED | OUTPATIENT
Start: 2021-01-01 | End: 2021-01-01

## 2021-01-01 RX ORDER — MUPIROCIN 20 MG/G
OINTMENT TOPICAL 2 TIMES DAILY
Status: DISPENSED | OUTPATIENT
Start: 2021-01-01 | End: 2021-01-01

## 2021-01-01 RX ORDER — PROPOFOL 10 MG/ML
INJECTION, EMULSION INTRAVENOUS
Status: COMPLETED
Start: 2021-01-01 | End: 2021-01-01

## 2021-01-01 RX ORDER — LORAZEPAM 2 MG/ML
INJECTION INTRAMUSCULAR
Status: COMPLETED
Start: 2021-01-01 | End: 2021-01-01

## 2021-01-01 RX ORDER — FAMOTIDINE 20 MG/1
20 TABLET, FILM COATED ORAL 2 TIMES DAILY
Status: DISCONTINUED | OUTPATIENT
Start: 2021-01-01 | End: 2021-01-01

## 2021-01-01 RX ORDER — LORAZEPAM 2 MG/ML
1 INJECTION INTRAMUSCULAR EVERY 6 HOURS PRN
Status: DISCONTINUED | OUTPATIENT
Start: 2021-01-01 | End: 2021-01-01

## 2021-01-01 RX ORDER — BUDESONIDE 0.25 MG/2ML
0.25 INHALANT ORAL DAILY
Status: DISCONTINUED | OUTPATIENT
Start: 2021-01-01 | End: 2021-01-01 | Stop reason: HOSPADM

## 2021-01-01 RX ORDER — INSULIN ASPART 100 [IU]/ML
0-5 INJECTION, SOLUTION INTRAVENOUS; SUBCUTANEOUS EVERY 6 HOURS PRN
Status: DISCONTINUED | OUTPATIENT
Start: 2021-01-01 | End: 2021-01-01

## 2021-01-01 RX ORDER — DEXAMETHASONE SODIUM PHOSPHATE 4 MG/ML
6 INJECTION, SOLUTION INTRA-ARTICULAR; INTRALESIONAL; INTRAMUSCULAR; INTRAVENOUS; SOFT TISSUE EVERY 24 HOURS
Status: DISCONTINUED | OUTPATIENT
Start: 2021-01-01 | End: 2021-01-01 | Stop reason: HOSPADM

## 2021-01-01 RX ORDER — LORAZEPAM 0.5 MG/1
1 TABLET ORAL ONCE
Status: COMPLETED | OUTPATIENT
Start: 2021-01-01 | End: 2021-01-01

## 2021-01-01 RX ORDER — AMOXICILLIN 250 MG
2 CAPSULE ORAL 2 TIMES DAILY
Status: DISCONTINUED | OUTPATIENT
Start: 2021-01-01 | End: 2021-01-01

## 2021-01-01 RX ORDER — FUROSEMIDE 10 MG/ML
120 INJECTION INTRAMUSCULAR; INTRAVENOUS ONCE
Status: COMPLETED | OUTPATIENT
Start: 2021-01-01 | End: 2021-01-01

## 2021-01-01 RX ORDER — INSULIN ASPART 100 [IU]/ML
6 INJECTION, SOLUTION INTRAVENOUS; SUBCUTANEOUS ONCE
Status: COMPLETED | OUTPATIENT
Start: 2021-01-01 | End: 2021-01-01

## 2021-01-01 RX ORDER — POTASSIUM CHLORIDE 20 MEQ/1
40 TABLET, EXTENDED RELEASE ORAL ONCE
Status: COMPLETED | OUTPATIENT
Start: 2021-01-01 | End: 2021-01-01

## 2021-01-01 RX ORDER — FUROSEMIDE 10 MG/ML
80 INJECTION INTRAMUSCULAR; INTRAVENOUS
Status: DISCONTINUED | OUTPATIENT
Start: 2021-01-01 | End: 2021-01-01

## 2021-01-01 RX ORDER — INSULIN ASPART 100 [IU]/ML
2 INJECTION, SOLUTION INTRAVENOUS; SUBCUTANEOUS EVERY 4 HOURS
Status: DISCONTINUED | OUTPATIENT
Start: 2021-01-01 | End: 2021-01-01

## 2021-01-01 RX ORDER — HYDROCODONE BITARTRATE AND ACETAMINOPHEN 500; 5 MG/1; MG/1
TABLET ORAL
Status: DISCONTINUED | OUTPATIENT
Start: 2021-01-01 | End: 2021-01-01

## 2021-01-01 RX ORDER — LORAZEPAM 2 MG/ML
1 INJECTION INTRAMUSCULAR ONCE
Status: COMPLETED | OUTPATIENT
Start: 2021-01-01 | End: 2021-01-01

## 2021-01-01 RX ORDER — ROCURONIUM BROMIDE 10 MG/ML
0.6 INJECTION, SOLUTION INTRAVENOUS ONCE
Status: COMPLETED | OUTPATIENT
Start: 2021-01-01 | End: 2021-01-01

## 2021-01-01 RX ORDER — INSULIN ASPART 100 [IU]/ML
1-10 INJECTION, SOLUTION INTRAVENOUS; SUBCUTANEOUS EVERY 4 HOURS PRN
Status: DISCONTINUED | OUTPATIENT
Start: 2021-01-01 | End: 2021-01-01

## 2021-01-01 RX ORDER — SODIUM CHLORIDE 0.9 % (FLUSH) 0.9 %
10 SYRINGE (ML) INJECTION
Status: DISCONTINUED | OUTPATIENT
Start: 2021-01-01 | End: 2021-01-01 | Stop reason: HOSPADM

## 2021-01-01 RX ORDER — NOREPINEPHRINE BITARTRATE/D5W 4MG/250ML
0-3 PLASTIC BAG, INJECTION (ML) INTRAVENOUS CONTINUOUS
Status: DISCONTINUED | OUTPATIENT
Start: 2021-01-01 | End: 2021-01-01

## 2021-01-01 RX ORDER — FENTANYL CITRATE 50 UG/ML
25 INJECTION, SOLUTION INTRAMUSCULAR; INTRAVENOUS
Status: DISCONTINUED | OUTPATIENT
Start: 2021-01-01 | End: 2021-01-01 | Stop reason: HOSPADM

## 2021-01-01 RX ORDER — PROPOFOL 10 MG/ML
0-50 INJECTION, EMULSION INTRAVENOUS CONTINUOUS
Status: DISCONTINUED | OUTPATIENT
Start: 2021-01-01 | End: 2021-01-01 | Stop reason: HOSPADM

## 2021-01-01 RX ORDER — ETOMIDATE 2 MG/ML
30 INJECTION INTRAVENOUS ONCE
Status: COMPLETED | OUTPATIENT
Start: 2021-01-01 | End: 2021-01-01

## 2021-01-01 RX ORDER — FUROSEMIDE 10 MG/ML
80 INJECTION INTRAMUSCULAR; INTRAVENOUS EVERY 6 HOURS
Status: DISCONTINUED | OUTPATIENT
Start: 2021-01-01 | End: 2021-01-01

## 2021-01-01 RX ORDER — NICARDIPINE HYDROCHLORIDE 0.2 MG/ML
INJECTION INTRAVENOUS
Status: COMPLETED
Start: 2021-01-01 | End: 2021-01-01

## 2021-01-01 RX ORDER — HYDROCODONE BITARTRATE AND ACETAMINOPHEN 500; 5 MG/1; MG/1
TABLET ORAL CONTINUOUS
Status: DISCONTINUED | OUTPATIENT
Start: 2021-01-01 | End: 2021-01-01 | Stop reason: HOSPADM

## 2021-01-01 RX ORDER — METOCLOPRAMIDE HYDROCHLORIDE 5 MG/ML
10 INJECTION INTRAMUSCULAR; INTRAVENOUS EVERY 6 HOURS
Status: COMPLETED | OUTPATIENT
Start: 2021-01-01 | End: 2021-01-01

## 2021-01-01 RX ORDER — MAGNESIUM SULFATE HEPTAHYDRATE 40 MG/ML
4 INJECTION, SOLUTION INTRAVENOUS
Status: DISCONTINUED | OUTPATIENT
Start: 2021-01-01 | End: 2021-01-01

## 2021-01-01 RX ORDER — CEFEPIME HYDROCHLORIDE 2 G/1
2 INJECTION, POWDER, FOR SOLUTION INTRAVENOUS
Status: DISCONTINUED | OUTPATIENT
Start: 2021-01-01 | End: 2021-01-01

## 2021-01-01 RX ORDER — ENOXAPARIN SODIUM 100 MG/ML
100 INJECTION SUBCUTANEOUS EVERY 12 HOURS
Status: DISCONTINUED | OUTPATIENT
Start: 2021-01-01 | End: 2021-01-01

## 2021-01-01 RX ORDER — ASCORBIC ACID 500 MG
500 TABLET ORAL 2 TIMES DAILY
Status: DISCONTINUED | OUTPATIENT
Start: 2021-01-01 | End: 2021-01-01 | Stop reason: HOSPADM

## 2021-01-01 RX ORDER — POTASSIUM CHLORIDE 29.8 MG/ML
80 INJECTION INTRAVENOUS
Status: DISCONTINUED | OUTPATIENT
Start: 2021-01-01 | End: 2021-01-01

## 2021-01-01 RX ORDER — IPRATROPIUM BROMIDE AND ALBUTEROL SULFATE 2.5; .5 MG/3ML; MG/3ML
3 SOLUTION RESPIRATORY (INHALATION) EVERY 6 HOURS
Status: DISCONTINUED | OUTPATIENT
Start: 2021-01-01 | End: 2021-01-01

## 2021-01-01 RX ORDER — HEPARIN SODIUM,PORCINE/D5W 25000/250
0-40 INTRAVENOUS SOLUTION INTRAVENOUS CONTINUOUS
Status: DISCONTINUED | OUTPATIENT
Start: 2021-01-01 | End: 2021-01-01

## 2021-01-01 RX ORDER — DIGOXIN 125 MCG
0.12 TABLET ORAL DAILY
Status: DISCONTINUED | OUTPATIENT
Start: 2021-01-01 | End: 2021-01-01

## 2021-01-01 RX ORDER — OLANZAPINE 10 MG/2ML
5 INJECTION, POWDER, FOR SOLUTION INTRAMUSCULAR EVERY 8 HOURS PRN
Status: DISCONTINUED | OUTPATIENT
Start: 2021-01-01 | End: 2021-01-01

## 2021-01-01 RX ORDER — POTASSIUM CHLORIDE 29.8 MG/ML
20 INJECTION INTRAVENOUS
Status: DISCONTINUED | OUTPATIENT
Start: 2021-01-01 | End: 2021-01-01

## 2021-01-01 RX ORDER — CISATRACURIUM BESYLATE 2 MG/ML
0.15 INJECTION, SOLUTION INTRAVENOUS ONCE
Status: DISCONTINUED | OUTPATIENT
Start: 2021-01-01 | End: 2021-01-01

## 2021-01-01 RX ORDER — HEPARIN SODIUM,PORCINE/D5W 25000/250
0-40 INTRAVENOUS SOLUTION INTRAVENOUS CONTINUOUS
Status: DISCONTINUED | OUTPATIENT
Start: 2021-01-01 | End: 2021-01-01 | Stop reason: HOSPADM

## 2021-01-01 RX ORDER — PANTOPRAZOLE SODIUM 40 MG/1
40 FOR SUSPENSION ORAL DAILY
Status: DISCONTINUED | OUTPATIENT
Start: 2021-01-01 | End: 2021-01-01 | Stop reason: HOSPADM

## 2021-01-01 RX ORDER — HEPARIN SODIUM 5000 [USP'U]/ML
5000 INJECTION, SOLUTION INTRAVENOUS; SUBCUTANEOUS EVERY 12 HOURS
Status: DISCONTINUED | OUTPATIENT
Start: 2021-01-01 | End: 2021-01-01

## 2021-01-01 RX ORDER — DEXAMETHASONE SODIUM PHOSPHATE 4 MG/ML
6 INJECTION, SOLUTION INTRA-ARTICULAR; INTRALESIONAL; INTRAMUSCULAR; INTRAVENOUS; SOFT TISSUE EVERY 24 HOURS
Status: COMPLETED | OUTPATIENT
Start: 2021-01-01 | End: 2021-01-01

## 2021-01-01 RX ORDER — SODIUM BICARBONATE 1 MEQ/ML
SYRINGE (ML) INTRAVENOUS
Status: COMPLETED
Start: 2021-01-01 | End: 2021-01-01

## 2021-01-01 RX ORDER — VASOPRESSIN 20 [USP'U]/ML
INJECTION, SOLUTION INTRAMUSCULAR; SUBCUTANEOUS
Status: COMPLETED
Start: 2021-01-01 | End: 2021-01-01

## 2021-01-01 RX ORDER — LEVOTHYROXINE SODIUM 20 UG/ML
62.5 INJECTION, SOLUTION INTRAVENOUS DAILY
Status: DISCONTINUED | OUTPATIENT
Start: 2021-01-01 | End: 2021-01-01

## 2021-01-01 RX ORDER — IPRATROPIUM BROMIDE AND ALBUTEROL SULFATE 2.5; .5 MG/3ML; MG/3ML
3 SOLUTION RESPIRATORY (INHALATION) EVERY 4 HOURS PRN
Status: DISCONTINUED | OUTPATIENT
Start: 2021-01-01 | End: 2021-01-01 | Stop reason: HOSPADM

## 2021-01-01 RX ORDER — INDOMETHACIN 25 MG/1
100 CAPSULE ORAL ONCE
Status: COMPLETED | OUTPATIENT
Start: 2021-01-01 | End: 2021-01-01

## 2021-01-01 RX ORDER — MAGNESIUM SULFATE HEPTAHYDRATE 40 MG/ML
2 INJECTION, SOLUTION INTRAVENOUS ONCE
Status: COMPLETED | OUTPATIENT
Start: 2021-01-01 | End: 2021-01-01

## 2021-01-01 RX ORDER — CISATRACURIUM BESYLATE 2 MG/ML
0.2 INJECTION, SOLUTION INTRAVENOUS ONCE
Status: COMPLETED | OUTPATIENT
Start: 2021-01-01 | End: 2021-01-01

## 2021-01-01 RX ORDER — POLYETHYLENE GLYCOL 3350 17 G/17G
17 POWDER, FOR SOLUTION ORAL 2 TIMES DAILY
Status: DISCONTINUED | OUTPATIENT
Start: 2021-01-01 | End: 2021-01-01

## 2021-01-01 RX ORDER — TACROLIMUS 1 MG/1
2 CAPSULE ORAL 2 TIMES DAILY
Status: DISCONTINUED | OUTPATIENT
Start: 2021-01-01 | End: 2021-01-01

## 2021-01-01 RX ORDER — TACROLIMUS 1 MG/1
1 CAPSULE ORAL 2 TIMES DAILY
Status: DISCONTINUED | OUTPATIENT
Start: 2021-01-01 | End: 2021-01-01

## 2021-01-01 RX ORDER — ROCURONIUM BROMIDE 10 MG/ML
100 INJECTION, SOLUTION INTRAVENOUS ONCE
Status: COMPLETED | OUTPATIENT
Start: 2021-01-01 | End: 2021-01-01

## 2021-01-01 RX ORDER — AMIODARONE HYDROCHLORIDE 150 MG/3ML
150 INJECTION, SOLUTION INTRAVENOUS ONCE
Status: COMPLETED | OUTPATIENT
Start: 2021-01-01 | End: 2021-01-01

## 2021-01-01 RX ORDER — POTASSIUM CHLORIDE 29.8 MG/ML
40 INJECTION INTRAVENOUS
Status: DISCONTINUED | OUTPATIENT
Start: 2021-01-01 | End: 2021-01-01

## 2021-01-01 RX ORDER — ZINC SULFATE 50(220)MG
220 CAPSULE ORAL DAILY
Status: DISCONTINUED | OUTPATIENT
Start: 2021-01-01 | End: 2021-01-01 | Stop reason: HOSPADM

## 2021-01-01 RX ORDER — LORAZEPAM 0.5 MG/1
1 TABLET ORAL EVERY 6 HOURS PRN
Status: DISCONTINUED | OUTPATIENT
Start: 2021-01-01 | End: 2021-01-01

## 2021-01-01 RX ORDER — FAMOTIDINE 10 MG/ML
20 INJECTION INTRAVENOUS DAILY
Status: DISCONTINUED | OUTPATIENT
Start: 2021-01-01 | End: 2021-01-01

## 2021-01-01 RX ORDER — FUROSEMIDE 10 MG/ML
120 INJECTION INTRAMUSCULAR; INTRAVENOUS EVERY 8 HOURS
Status: DISCONTINUED | OUTPATIENT
Start: 2021-01-01 | End: 2021-01-01

## 2021-01-01 RX ORDER — LACTULOSE 10 G/15ML
20 SOLUTION ORAL DAILY
Status: DISCONTINUED | OUTPATIENT
Start: 2021-01-01 | End: 2021-01-01

## 2021-01-01 RX ORDER — SODIUM CHLORIDE 9 MG/ML
INJECTION, SOLUTION INTRAVENOUS
Status: COMPLETED | OUTPATIENT
Start: 2021-01-01 | End: 2021-01-01

## 2021-01-01 RX ORDER — METOPROLOL TARTRATE 1 MG/ML
5 INJECTION, SOLUTION INTRAVENOUS
Status: DISCONTINUED | OUTPATIENT
Start: 2021-01-01 | End: 2021-01-01

## 2021-01-01 RX ORDER — ENOXAPARIN SODIUM 100 MG/ML
40 INJECTION SUBCUTANEOUS
Status: COMPLETED | OUTPATIENT
Start: 2021-01-01 | End: 2021-01-01

## 2021-01-01 RX ORDER — HYDROCODONE BITARTRATE AND ACETAMINOPHEN 500; 5 MG/1; MG/1
TABLET ORAL CONTINUOUS
Status: DISCONTINUED | OUTPATIENT
Start: 2021-01-01 | End: 2021-01-01

## 2021-01-01 RX ORDER — PANTOPRAZOLE SODIUM 40 MG/1
40 TABLET, DELAYED RELEASE ORAL DAILY
Status: DISCONTINUED | OUTPATIENT
Start: 2021-01-01 | End: 2021-01-01

## 2021-01-01 RX ORDER — ZINC SULFATE 50(220)MG
220 CAPSULE ORAL DAILY
Status: DISCONTINUED | OUTPATIENT
Start: 2021-01-01 | End: 2021-01-01

## 2021-01-01 RX ORDER — METOPROLOL TARTRATE 1 MG/ML
5 INJECTION, SOLUTION INTRAVENOUS ONCE
Status: COMPLETED | OUTPATIENT
Start: 2021-01-01 | End: 2021-01-01

## 2021-01-01 RX ORDER — CHOLECALCIFEROL (VITAMIN D3) 25 MCG
1000 TABLET ORAL DAILY
Status: DISCONTINUED | OUTPATIENT
Start: 2021-01-01 | End: 2021-01-01 | Stop reason: HOSPADM

## 2021-01-01 RX ORDER — FENTANYL CITRATE-0.9 % NACL/PF 10 MCG/ML
0-300 PLASTIC BAG, INJECTION (ML) INTRAVENOUS CONTINUOUS
Status: DISCONTINUED | OUTPATIENT
Start: 2021-01-01 | End: 2021-01-01 | Stop reason: HOSPADM

## 2021-01-01 RX ORDER — CEFEPIME HYDROCHLORIDE 2 G/1
2 INJECTION, POWDER, FOR SOLUTION INTRAVENOUS
Status: DISCONTINUED | OUTPATIENT
Start: 2021-01-01 | End: 2021-01-01 | Stop reason: HOSPADM

## 2021-01-01 RX ORDER — LORAZEPAM 2 MG/ML
0.5 INJECTION INTRAMUSCULAR
Status: DISCONTINUED | OUTPATIENT
Start: 2021-01-01 | End: 2021-01-01 | Stop reason: HOSPADM

## 2021-01-01 RX ORDER — FUROSEMIDE 10 MG/ML
100 INJECTION INTRAMUSCULAR; INTRAVENOUS EVERY 6 HOURS
Status: DISCONTINUED | OUTPATIENT
Start: 2021-01-01 | End: 2021-01-01

## 2021-01-01 RX ORDER — FAMOTIDINE 20 MG/1
20 TABLET, FILM COATED ORAL DAILY
Status: DISCONTINUED | OUTPATIENT
Start: 2021-01-01 | End: 2021-01-01

## 2021-01-01 RX ORDER — INDOMETHACIN 25 MG/1
50 CAPSULE ORAL ONCE
Status: COMPLETED | OUTPATIENT
Start: 2021-01-01 | End: 2021-01-01

## 2021-01-01 RX ADMIN — PROPOFOL 50 MCG/KG/MIN: 10 INJECTION, EMULSION INTRAVENOUS at 01:10

## 2021-01-01 RX ADMIN — IPRATROPIUM BROMIDE AND ALBUTEROL SULFATE 3 ML: 2.5; .5 SOLUTION RESPIRATORY (INHALATION) at 01:09

## 2021-01-01 RX ADMIN — SODIUM CHLORIDE: 0.9 INJECTION, SOLUTION INTRAVENOUS at 06:10

## 2021-01-01 RX ADMIN — VASOPRESSIN 0.04 UNITS/MIN: 20 INJECTION INTRAVENOUS at 10:10

## 2021-01-01 RX ADMIN — CISATRACURIUM BESYLATE 4.5 MCG/KG/MIN: 10 INJECTION, SOLUTION INTRAVENOUS at 02:10

## 2021-01-01 RX ADMIN — FENTANYL CITRATE 300 MCG/HR: 50 INJECTION INTRAVENOUS at 03:09

## 2021-01-01 RX ADMIN — CISATRACURIUM BESYLATE 4 MCG/KG/MIN: 10 INJECTION, SOLUTION INTRAVENOUS at 07:09

## 2021-01-01 RX ADMIN — OXYCODONE HYDROCHLORIDE AND ACETAMINOPHEN 500 MG: 500 TABLET ORAL at 08:10

## 2021-01-01 RX ADMIN — FENTANYL CITRATE 300 MCG/HR: 50 INJECTION INTRAVENOUS at 07:09

## 2021-01-01 RX ADMIN — NOREPINEPHRINE BITARTRATE 0.08 MCG/KG/MIN: 1 INJECTION, SOLUTION, CONCENTRATE INTRAVENOUS at 03:09

## 2021-01-01 RX ADMIN — FUROSEMIDE 80 MG: 10 INJECTION, SOLUTION INTRAMUSCULAR; INTRAVENOUS at 11:10

## 2021-01-01 RX ADMIN — LEVOTHYROXINE SODIUM 125 MCG: 25 TABLET ORAL at 08:09

## 2021-01-01 RX ADMIN — FAMOTIDINE 20 MG: 10 INJECTION INTRAVENOUS at 08:09

## 2021-01-01 RX ADMIN — MINERAL OIL, PETROLATUM: 425; 573 OINTMENT OPHTHALMIC at 08:09

## 2021-01-01 RX ADMIN — INSULIN ASPART 1 UNITS: 100 INJECTION, SOLUTION INTRAVENOUS; SUBCUTANEOUS at 12:09

## 2021-01-01 RX ADMIN — FUROSEMIDE 100 MG: 10 INJECTION, SOLUTION INTRAMUSCULAR; INTRAVENOUS at 05:10

## 2021-01-01 RX ADMIN — SODIUM ZIRCONIUM CYCLOSILICATE 10 G: 10 POWDER, FOR SUSPENSION ORAL at 10:09

## 2021-01-01 RX ADMIN — MINERAL OIL, PETROLATUM: 425; 573 OINTMENT OPHTHALMIC at 09:10

## 2021-01-01 RX ADMIN — FENTANYL CITRATE 300 MCG/HR: 50 INJECTION INTRAVENOUS at 12:09

## 2021-01-01 RX ADMIN — REMDESIVIR 100 MG: 100 INJECTION, POWDER, LYOPHILIZED, FOR SOLUTION INTRAVENOUS at 04:09

## 2021-01-01 RX ADMIN — SENNOSIDES AND DOCUSATE SODIUM 2 TABLET: 8.6; 5 TABLET ORAL at 09:10

## 2021-01-01 RX ADMIN — INSULIN ASPART 2 UNITS: 100 INJECTION, SOLUTION INTRAVENOUS; SUBCUTANEOUS at 08:10

## 2021-01-01 RX ADMIN — PROPOFOL 50 MCG/KG/MIN: 10 INJECTION, EMULSION INTRAVENOUS at 02:09

## 2021-01-01 RX ADMIN — Medication: at 01:09

## 2021-01-01 RX ADMIN — INSULIN DETEMIR 12 UNITS: 100 INJECTION, SOLUTION SUBCUTANEOUS at 12:09

## 2021-01-01 RX ADMIN — METOCLOPRAMIDE 10 MG: 5 INJECTION, SOLUTION INTRAMUSCULAR; INTRAVENOUS at 11:10

## 2021-01-01 RX ADMIN — HEPARIN SODIUM 5000 UNITS: 5000 INJECTION INTRAVENOUS; SUBCUTANEOUS at 09:09

## 2021-01-01 RX ADMIN — PROPOFOL 50 MCG/KG/MIN: 10 INJECTION, EMULSION INTRAVENOUS at 03:10

## 2021-01-01 RX ADMIN — MAGNESIUM SULFATE IN WATER 2 G: 40 INJECTION, SOLUTION INTRAVENOUS at 07:10

## 2021-01-01 RX ADMIN — INSULIN ASPART 4 UNITS: 100 INJECTION, SOLUTION INTRAVENOUS; SUBCUTANEOUS at 04:10

## 2021-01-01 RX ADMIN — Medication 250 MCG/HR: at 12:09

## 2021-01-01 RX ADMIN — Medication 0.12 MCG/KG/MIN: at 11:10

## 2021-01-01 RX ADMIN — FENTANYL CITRATE 300 MCG/HR: 50 INJECTION INTRAVENOUS at 05:09

## 2021-01-01 RX ADMIN — MINERAL OIL, PETROLATUM: 425; 573 OINTMENT OPHTHALMIC at 02:10

## 2021-01-01 RX ADMIN — INSULIN HUMAN 4 UNITS/HR: 1 INJECTION, SOLUTION INTRAVENOUS at 12:09

## 2021-01-01 RX ADMIN — INSULIN ASPART 2 UNITS: 100 INJECTION, SOLUTION INTRAVENOUS; SUBCUTANEOUS at 05:10

## 2021-01-01 RX ADMIN — AMIODARONE HYDROCHLORIDE 0.5 MG/MIN: 1.8 INJECTION, SOLUTION INTRAVENOUS at 10:10

## 2021-01-01 RX ADMIN — INSULIN ASPART 4 UNITS: 100 INJECTION, SOLUTION INTRAVENOUS; SUBCUTANEOUS at 12:09

## 2021-01-01 RX ADMIN — LEVOTHYROXINE SODIUM 62.6 MCG: 20 INJECTION, SOLUTION INTRAVENOUS at 09:09

## 2021-01-01 RX ADMIN — METOROPROLOL TARTRATE 5 MG: 5 INJECTION, SOLUTION INTRAVENOUS at 05:09

## 2021-01-01 RX ADMIN — AMIODARONE HYDROCHLORIDE 150 MG: 50 INJECTION, SOLUTION INTRAVENOUS at 11:10

## 2021-01-01 RX ADMIN — NITROGLYCERIN 0.5 INCH: 20 OINTMENT TOPICAL at 05:10

## 2021-01-01 RX ADMIN — FUROSEMIDE 240 MG: 10 INJECTION, SOLUTION INTRAMUSCULAR; INTRAVENOUS at 11:10

## 2021-01-01 RX ADMIN — NOREPINEPHRINE BITARTRATE 0.64 MCG/KG/MIN: 1 INJECTION, SOLUTION, CONCENTRATE INTRAVENOUS at 12:10

## 2021-01-01 RX ADMIN — ZINC SULFATE 220 MG (50 MG) CAPSULE 220 MG: CAPSULE at 08:10

## 2021-01-01 RX ADMIN — FENTANYL CITRATE 300 MCG/HR: 50 INJECTION INTRAVENOUS at 03:10

## 2021-01-01 RX ADMIN — MIDAZOLAM HYDROCHLORIDE 4 MG/HR: 5 INJECTION, SOLUTION INTRAMUSCULAR; INTRAVENOUS at 12:10

## 2021-01-01 RX ADMIN — INSULIN ASPART 1 UNITS: 100 INJECTION, SOLUTION INTRAVENOUS; SUBCUTANEOUS at 08:09

## 2021-01-01 RX ADMIN — PROPOFOL 50 MCG/KG/MIN: 10 INJECTION, EMULSION INTRAVENOUS at 11:09

## 2021-01-01 RX ADMIN — BARICITINIB 2 MG: 2 TABLET, FILM COATED ORAL at 08:09

## 2021-01-01 RX ADMIN — PROPOFOL 50 MCG/KG/MIN: 10 INJECTION, EMULSION INTRAVENOUS at 05:10

## 2021-01-01 RX ADMIN — NITROGLYCERIN 0.5 INCH: 20 OINTMENT TOPICAL at 12:10

## 2021-01-01 RX ADMIN — INSULIN HUMAN 2.5 UNITS/HR: 1 INJECTION, SOLUTION INTRAVENOUS at 08:09

## 2021-01-01 RX ADMIN — CISATRACURIUM BESYLATE 2 MCG/KG/MIN: 10 INJECTION, SOLUTION INTRAVENOUS at 09:09

## 2021-01-01 RX ADMIN — MUPIROCIN: 20 OINTMENT TOPICAL at 09:09

## 2021-01-01 RX ADMIN — PROPOFOL 50 MCG/KG/MIN: 10 INJECTION, EMULSION INTRAVENOUS at 04:10

## 2021-01-01 RX ADMIN — Medication 1000 UNITS: at 08:10

## 2021-01-01 RX ADMIN — FUROSEMIDE 60 MG: 20 INJECTION, SOLUTION INTRAMUSCULAR; INTRAVENOUS at 04:09

## 2021-01-01 RX ADMIN — VASOPRESSIN 0.04 UNITS/MIN: 20 INJECTION INTRAVENOUS at 04:10

## 2021-01-01 RX ADMIN — MUPIROCIN: 20 OINTMENT TOPICAL at 08:09

## 2021-01-01 RX ADMIN — TAMSULOSIN HYDROCHLORIDE 0.4 MG: 0.4 CAPSULE ORAL at 09:09

## 2021-01-01 RX ADMIN — MINERAL OIL, PETROLATUM: 425; 573 OINTMENT OPHTHALMIC at 08:10

## 2021-01-01 RX ADMIN — TACROLIMUS 2 MG: 1 CAPSULE, GELATIN COATED ORAL at 07:10

## 2021-01-01 RX ADMIN — CISATRACURIUM BESYLATE 6 MCG/KG/MIN: 10 INJECTION, SOLUTION INTRAVENOUS at 11:10

## 2021-01-01 RX ADMIN — TACROLIMUS 2 MG: 1 CAPSULE, GELATIN COATED ORAL at 09:09

## 2021-01-01 RX ADMIN — MINERAL OIL, PETROLATUM: 425; 573 OINTMENT OPHTHALMIC at 03:09

## 2021-01-01 RX ADMIN — LORAZEPAM 1 MG: 0.5 TABLET ORAL at 08:09

## 2021-01-01 RX ADMIN — AMIODARONE HYDROCHLORIDE 200 MG: 200 TABLET ORAL at 08:10

## 2021-01-01 RX ADMIN — INSULIN DETEMIR 20 UNITS: 100 INJECTION, SOLUTION SUBCUTANEOUS at 09:09

## 2021-01-01 RX ADMIN — ETOMIDATE 30 MG: 2 INJECTION, SOLUTION INTRAVENOUS at 09:09

## 2021-01-01 RX ADMIN — BARICITINIB 1 MG: 1 TABLET, FILM COATED ORAL at 08:09

## 2021-01-01 RX ADMIN — ZIPRASIDONE MESYLATE 10 MG: 20 INJECTION, POWDER, LYOPHILIZED, FOR SOLUTION INTRAMUSCULAR at 05:09

## 2021-01-01 RX ADMIN — PROPOFOL 50 MCG/KG/MIN: 10 INJECTION, EMULSION INTRAVENOUS at 06:09

## 2021-01-01 RX ADMIN — DEXAMETHASONE SODIUM PHOSPHATE 6 MG: 4 INJECTION INTRA-ARTICULAR; INTRALESIONAL; INTRAMUSCULAR; INTRAVENOUS; SOFT TISSUE at 08:10

## 2021-01-01 RX ADMIN — OXYCODONE HYDROCHLORIDE AND ACETAMINOPHEN 500 MG: 500 TABLET ORAL at 09:10

## 2021-01-01 RX ADMIN — PROPOFOL 50 MCG/KG/MIN: 10 INJECTION, EMULSION INTRAVENOUS at 09:10

## 2021-01-01 RX ADMIN — SENNOSIDES AND DOCUSATE SODIUM 2 TABLET: 8.6; 5 TABLET ORAL at 08:10

## 2021-01-01 RX ADMIN — INSULIN ASPART 4 UNITS: 100 INJECTION, SOLUTION INTRAVENOUS; SUBCUTANEOUS at 04:09

## 2021-01-01 RX ADMIN — INSULIN ASPART 1 UNITS: 100 INJECTION, SOLUTION INTRAVENOUS; SUBCUTANEOUS at 12:10

## 2021-01-01 RX ADMIN — DEXAMETHASONE SODIUM PHOSPHATE 6 MG: 4 INJECTION INTRA-ARTICULAR; INTRALESIONAL; INTRAMUSCULAR; INTRAVENOUS; SOFT TISSUE at 09:09

## 2021-01-01 RX ADMIN — NOREPINEPHRINE BITARTRATE 0.24 MCG/KG/MIN: 1 INJECTION, SOLUTION, CONCENTRATE INTRAVENOUS at 12:10

## 2021-01-01 RX ADMIN — AMIODARONE HYDROCHLORIDE 0.5 MG/MIN: 1.8 INJECTION, SOLUTION INTRAVENOUS at 09:09

## 2021-01-01 RX ADMIN — FUROSEMIDE 100 MG: 10 INJECTION, SOLUTION INTRAMUSCULAR; INTRAVENOUS at 01:10

## 2021-01-01 RX ADMIN — INSULIN ASPART 6 UNITS: 100 INJECTION, SOLUTION INTRAVENOUS; SUBCUTANEOUS at 08:09

## 2021-01-01 RX ADMIN — OXYCODONE HYDROCHLORIDE AND ACETAMINOPHEN 500 MG: 500 TABLET ORAL at 08:09

## 2021-01-01 RX ADMIN — FENTANYL CITRATE 300 MCG/HR: 50 INJECTION INTRAVENOUS at 04:09

## 2021-01-01 RX ADMIN — IPRATROPIUM BROMIDE AND ALBUTEROL SULFATE 3 ML: 2.5; .5 SOLUTION RESPIRATORY (INHALATION) at 07:09

## 2021-01-01 RX ADMIN — FENTANYL CITRATE 300 MCG/HR: 50 INJECTION INTRAVENOUS at 10:10

## 2021-01-01 RX ADMIN — TACROLIMUS 2 MG: 1 CAPSULE, GELATIN COATED ORAL at 05:10

## 2021-01-01 RX ADMIN — POLYETHYLENE GLYCOL 3350 17 G: 17 POWDER, FOR SOLUTION ORAL at 09:10

## 2021-01-01 RX ADMIN — FUROSEMIDE 200 MG: 10 INJECTION, SOLUTION INTRAMUSCULAR; INTRAVENOUS at 01:10

## 2021-01-01 RX ADMIN — INSULIN ASPART 2 UNITS: 100 INJECTION, SOLUTION INTRAVENOUS; SUBCUTANEOUS at 04:10

## 2021-01-01 RX ADMIN — CISATRACURIUM BESYLATE 2 MCG/KG/MIN: 10 INJECTION, SOLUTION INTRAVENOUS at 02:09

## 2021-01-01 RX ADMIN — VASOPRESSIN 0.04 UNITS/MIN: 20 INJECTION INTRAVENOUS at 09:09

## 2021-01-01 RX ADMIN — IPRATROPIUM BROMIDE AND ALBUTEROL SULFATE 3 ML: 2.5; .5 SOLUTION RESPIRATORY (INHALATION) at 08:09

## 2021-01-01 RX ADMIN — SODIUM CHLORIDE 500 ML: 0.9 INJECTION, SOLUTION INTRAVENOUS at 03:09

## 2021-01-01 RX ADMIN — INSULIN ASPART 2 UNITS: 100 INJECTION, SOLUTION INTRAVENOUS; SUBCUTANEOUS at 04:09

## 2021-01-01 RX ADMIN — PROPOFOL 50 MCG/KG/MIN: 10 INJECTION, EMULSION INTRAVENOUS at 12:10

## 2021-01-01 RX ADMIN — PROPOFOL 50 MCG/KG/MIN: 10 INJECTION, EMULSION INTRAVENOUS at 04:09

## 2021-01-01 RX ADMIN — DEXAMETHASONE SODIUM PHOSPHATE 6 MG: 4 INJECTION INTRA-ARTICULAR; INTRALESIONAL; INTRAMUSCULAR; INTRAVENOUS; SOFT TISSUE at 08:09

## 2021-01-01 RX ADMIN — METOROPROLOL TARTRATE 5 MG: 5 INJECTION, SOLUTION INTRAVENOUS at 07:10

## 2021-01-01 RX ADMIN — IPRATROPIUM BROMIDE AND ALBUTEROL SULFATE 3 ML: 2.5; .5 SOLUTION RESPIRATORY (INHALATION) at 08:10

## 2021-01-01 RX ADMIN — PROPOFOL 50 MCG/KG/MIN: 10 INJECTION, EMULSION INTRAVENOUS at 06:10

## 2021-01-01 RX ADMIN — ZINC SULFATE 220 MG (50 MG) CAPSULE 220 MG: CAPSULE at 08:09

## 2021-01-01 RX ADMIN — KETAMINE HYDROCHLORIDE 2.5 MCG/KG/MIN: 100 INJECTION INTRAMUSCULAR; INTRAVENOUS at 12:09

## 2021-01-01 RX ADMIN — FENTANYL CITRATE 300 MCG/HR: 50 INJECTION INTRAVENOUS at 08:10

## 2021-01-01 RX ADMIN — FENTANYL CITRATE 300 MCG/HR: 50 INJECTION INTRAVENOUS at 11:10

## 2021-01-01 RX ADMIN — NOREPINEPHRINE BITARTRATE 1.2 MCG/KG/MIN: 1 INJECTION, SOLUTION, CONCENTRATE INTRAVENOUS at 02:09

## 2021-01-01 RX ADMIN — LEVOTHYROXINE SODIUM 125 MCG: 25 TABLET ORAL at 08:10

## 2021-01-01 RX ADMIN — INSULIN ASPART 6 UNITS: 100 INJECTION, SOLUTION INTRAVENOUS; SUBCUTANEOUS at 04:10

## 2021-01-01 RX ADMIN — DEXAMETHASONE SODIUM PHOSPHATE 6 MG: 4 INJECTION, SOLUTION INTRA-ARTICULAR; INTRALESIONAL; INTRAMUSCULAR; INTRAVENOUS; SOFT TISSUE at 08:09

## 2021-01-01 RX ADMIN — NOREPINEPHRINE BITARTRATE 1.7 MCG/KG/MIN: 1 INJECTION, SOLUTION, CONCENTRATE INTRAVENOUS at 12:10

## 2021-01-01 RX ADMIN — CISATRACURIUM BESYLATE 3 MCG/KG/MIN: 10 INJECTION, SOLUTION INTRAVENOUS at 05:09

## 2021-01-01 RX ADMIN — FUROSEMIDE 240 MG: 10 INJECTION, SOLUTION INTRAMUSCULAR; INTRAVENOUS at 05:10

## 2021-01-01 RX ADMIN — Medication 0.16 MCG/KG/MIN: at 11:10

## 2021-01-01 RX ADMIN — DEXAMETHASONE SODIUM PHOSPHATE 6 MG: 4 INJECTION INTRA-ARTICULAR; INTRALESIONAL; INTRAMUSCULAR; INTRAVENOUS; SOFT TISSUE at 09:10

## 2021-01-01 RX ADMIN — TACROLIMUS 2 MG: 1 CAPSULE, GELATIN COATED ORAL at 08:09

## 2021-01-01 RX ADMIN — DEXMEDETOMIDINE HYDROCHLORIDE 0.2 MCG/KG/HR: 4 INJECTION, SOLUTION INTRAVENOUS at 04:09

## 2021-01-01 RX ADMIN — INSULIN ASPART 2 UNITS: 100 INJECTION, SOLUTION INTRAVENOUS; SUBCUTANEOUS at 12:10

## 2021-01-01 RX ADMIN — CISATRACURIUM BESYLATE 6 MCG/KG/MIN: 10 INJECTION, SOLUTION INTRAVENOUS at 03:10

## 2021-01-01 RX ADMIN — VANCOMYCIN HYDROCHLORIDE 1000 MG: 1 INJECTION, POWDER, LYOPHILIZED, FOR SOLUTION INTRAVENOUS at 02:10

## 2021-01-01 RX ADMIN — HYDROCORTISONE SODIUM SUCCINATE 50 MG: 100 INJECTION, POWDER, FOR SOLUTION INTRAMUSCULAR; INTRAVENOUS at 01:10

## 2021-01-01 RX ADMIN — VASOPRESSIN 0.04 UNITS/MIN: 20 INJECTION INTRAVENOUS at 01:10

## 2021-01-01 RX ADMIN — SODIUM ZIRCONIUM CYCLOSILICATE 10 G: 10 POWDER, FOR SUSPENSION ORAL at 09:10

## 2021-01-01 RX ADMIN — Medication 220 MG: at 08:09

## 2021-01-01 RX ADMIN — TACROLIMUS 2 MG: 1 CAPSULE, GELATIN COATED ORAL at 06:09

## 2021-01-01 RX ADMIN — FENTANYL CITRATE 300 MCG/HR: 50 INJECTION INTRAVENOUS at 01:10

## 2021-01-01 RX ADMIN — SODIUM BICARBONATE 100 MEQ: 84 INJECTION INTRAVENOUS at 02:10

## 2021-01-01 RX ADMIN — IPRATROPIUM BROMIDE AND ALBUTEROL SULFATE 3 ML: 2.5; .5 SOLUTION RESPIRATORY (INHALATION) at 12:09

## 2021-01-01 RX ADMIN — MINERAL OIL, PETROLATUM: 425; 573 OINTMENT OPHTHALMIC at 03:10

## 2021-01-01 RX ADMIN — INSULIN ASPART 5 UNITS: 100 INJECTION, SOLUTION INTRAVENOUS; SUBCUTANEOUS at 09:09

## 2021-01-01 RX ADMIN — MIDAZOLAM HYDROCHLORIDE 4 MG/HR: 5 INJECTION, SOLUTION INTRAMUSCULAR; INTRAVENOUS at 08:10

## 2021-01-01 RX ADMIN — TACROLIMUS 2 MG: 1 CAPSULE, GELATIN COATED ORAL at 08:10

## 2021-01-01 RX ADMIN — SODIUM CHLORIDE: 0.9 INJECTION, SOLUTION INTRAVENOUS at 05:10

## 2021-01-01 RX ADMIN — VASOPRESSIN 0.04 UNITS/MIN: 20 INJECTION INTRAVENOUS at 05:09

## 2021-01-01 RX ADMIN — Medication 1000 UNITS: at 08:09

## 2021-01-01 RX ADMIN — KETAMINE HYDROCHLORIDE 7.5 MCG/KG/MIN: 100 INJECTION INTRAMUSCULAR; INTRAVENOUS at 07:09

## 2021-01-01 RX ADMIN — FAMOTIDINE 20 MG: 10 INJECTION INTRAVENOUS at 08:10

## 2021-01-01 RX ADMIN — DEXTROSE 3.5 MCG/KG/MIN: 50 INJECTION, SOLUTION INTRAVENOUS at 09:10

## 2021-01-01 RX ADMIN — FUROSEMIDE 100 MG: 10 INJECTION, SOLUTION INTRAMUSCULAR; INTRAVENOUS at 11:10

## 2021-01-01 RX ADMIN — TACROLIMUS 2 MG: 1 CAPSULE, GELATIN COATED ORAL at 11:10

## 2021-01-01 RX ADMIN — PROPOFOL 50 MCG/KG/MIN: 10 INJECTION, EMULSION INTRAVENOUS at 05:09

## 2021-01-01 RX ADMIN — CISATRACURIUM BESYLATE 4 MCG/KG/MIN: 10 INJECTION, SOLUTION INTRAVENOUS at 04:09

## 2021-01-01 RX ADMIN — LEVOTHYROXINE SODIUM 125 MCG: 25 TABLET ORAL at 09:10

## 2021-01-01 RX ADMIN — LORAZEPAM 1 MG: 2 INJECTION INTRAMUSCULAR; INTRAVENOUS at 08:09

## 2021-01-01 RX ADMIN — INSULIN ASPART 4 UNITS: 100 INJECTION, SOLUTION INTRAVENOUS; SUBCUTANEOUS at 11:10

## 2021-01-01 RX ADMIN — CEFEPIME 2 G: 2 INJECTION, POWDER, FOR SOLUTION INTRAVENOUS at 09:10

## 2021-01-01 RX ADMIN — FENTANYL CITRATE 300 MCG/HR: 50 INJECTION INTRAVENOUS at 05:10

## 2021-01-01 RX ADMIN — CISATRACURIUM BESYLATE 3 MCG/KG/MIN: 10 INJECTION, SOLUTION INTRAVENOUS at 04:09

## 2021-01-01 RX ADMIN — IPRATROPIUM BROMIDE AND ALBUTEROL SULFATE 3 ML: 2.5; .5 SOLUTION RESPIRATORY (INHALATION) at 01:10

## 2021-01-01 RX ADMIN — SODIUM CHLORIDE 1000 ML: 0.9 INJECTION, SOLUTION INTRAVENOUS at 08:09

## 2021-01-01 RX ADMIN — DIGOXIN 125 MCG: 0.25 INJECTION INTRAMUSCULAR; INTRAVENOUS at 08:10

## 2021-01-01 RX ADMIN — DEXMEDETOMIDINE HYDROCHLORIDE 0.2 MCG/KG/HR: 4 INJECTION, SOLUTION INTRAVENOUS at 06:09

## 2021-01-01 RX ADMIN — PROPOFOL 50 MCG/KG/MIN: 10 INJECTION, EMULSION INTRAVENOUS at 09:09

## 2021-01-01 RX ADMIN — Medication 250 MCG/HR: at 07:09

## 2021-01-01 RX ADMIN — INSULIN ASPART 3 UNITS: 100 INJECTION, SOLUTION INTRAVENOUS; SUBCUTANEOUS at 05:09

## 2021-01-01 RX ADMIN — INSULIN ASPART 4 UNITS: 100 INJECTION, SOLUTION INTRAVENOUS; SUBCUTANEOUS at 08:10

## 2021-01-01 RX ADMIN — DEXTROSE 2 MCG/KG/MIN: 50 INJECTION, SOLUTION INTRAVENOUS at 11:10

## 2021-01-01 RX ADMIN — INSULIN ASPART 2 UNITS: 100 INJECTION, SOLUTION INTRAVENOUS; SUBCUTANEOUS at 08:09

## 2021-01-01 RX ADMIN — PANTOPRAZOLE SODIUM 40 MG: 40 GRANULE, DELAYED RELEASE ORAL at 09:10

## 2021-01-01 RX ADMIN — INSULIN ASPART 4 UNITS: 100 INJECTION, SOLUTION INTRAVENOUS; SUBCUTANEOUS at 06:09

## 2021-01-01 RX ADMIN — CEFTRIAXONE SODIUM 2 G: 2 INJECTION, SOLUTION INTRAVENOUS at 10:09

## 2021-01-01 RX ADMIN — REMDESIVIR 100 MG: 100 INJECTION, POWDER, LYOPHILIZED, FOR SOLUTION INTRAVENOUS at 08:09

## 2021-01-01 RX ADMIN — VASOPRESSIN 0.04 UNITS/MIN: 20 INJECTION INTRAVENOUS at 08:10

## 2021-01-01 RX ADMIN — INSULIN HUMAN 2.7 UNITS/HR: 1 INJECTION, SOLUTION INTRAVENOUS at 01:09

## 2021-01-01 RX ADMIN — NOREPINEPHRINE BITARTRATE 0.64 MCG/KG/MIN: 1 INJECTION, SOLUTION, CONCENTRATE INTRAVENOUS at 03:10

## 2021-01-01 RX ADMIN — AMIODARONE HYDROCHLORIDE 200 MG: 200 TABLET ORAL at 09:10

## 2021-01-01 RX ADMIN — FENTANYL CITRATE 300 MCG/HR: 50 INJECTION INTRAVENOUS at 06:09

## 2021-01-01 RX ADMIN — CEFTRIAXONE SODIUM 2 G: 2 INJECTION, SOLUTION INTRAVENOUS at 09:09

## 2021-01-01 RX ADMIN — CISATRACURIUM BESYLATE 6.02 MCG/KG/MIN: 10 INJECTION, SOLUTION INTRAVENOUS at 03:10

## 2021-01-01 RX ADMIN — NOREPINEPHRINE BITARTRATE 0.03 MCG/KG/MIN: 1 INJECTION, SOLUTION, CONCENTRATE INTRAVENOUS at 02:09

## 2021-01-01 RX ADMIN — INSULIN ASPART 2 UNITS: 100 INJECTION, SOLUTION INTRAVENOUS; SUBCUTANEOUS at 12:09

## 2021-01-01 RX ADMIN — SENNOSIDES AND DOCUSATE SODIUM 2 TABLET: 8.6; 5 TABLET ORAL at 08:09

## 2021-01-01 RX ADMIN — TACROLIMUS 2 MG: 1 CAPSULE ORAL at 08:09

## 2021-01-01 RX ADMIN — FAMOTIDINE 20 MG: 20 TABLET ORAL at 08:09

## 2021-01-01 RX ADMIN — KETAMINE HYDROCHLORIDE 7.5 MCG/KG/MIN: 100 INJECTION INTRAMUSCULAR; INTRAVENOUS at 08:09

## 2021-01-01 RX ADMIN — PROPOFOL 50 MCG/KG/MIN: 10 INJECTION, EMULSION INTRAVENOUS at 07:09

## 2021-01-01 RX ADMIN — NOREPINEPHRINE BITARTRATE 0.15 MCG/KG/MIN: 1 INJECTION, SOLUTION, CONCENTRATE INTRAVENOUS at 06:09

## 2021-01-01 RX ADMIN — Medication 1000 UNITS: at 12:09

## 2021-01-01 RX ADMIN — INSULIN ASPART 2 UNITS: 100 INJECTION, SOLUTION INTRAVENOUS; SUBCUTANEOUS at 07:09

## 2021-01-01 RX ADMIN — HEPARIN SODIUM 5000 UNITS: 5000 INJECTION INTRAVENOUS; SUBCUTANEOUS at 05:09

## 2021-01-01 RX ADMIN — ENOXAPARIN SODIUM 100 MG: 100 INJECTION SUBCUTANEOUS at 09:09

## 2021-01-01 RX ADMIN — METOPROLOL TARTRATE 25 MG: 25 TABLET, FILM COATED ORAL at 08:09

## 2021-01-01 RX ADMIN — POLYETHYLENE GLYCOL 3350 17 G: 17 POWDER, FOR SOLUTION ORAL at 08:09

## 2021-01-01 RX ADMIN — HEPARIN SODIUM AND DEXTROSE 16 UNITS/KG/HR: 10000; 5 INJECTION INTRAVENOUS at 04:10

## 2021-01-01 RX ADMIN — SODIUM CHLORIDE 1000 ML: 0.9 INJECTION, SOLUTION INTRAVENOUS at 10:09

## 2021-01-01 RX ADMIN — LORAZEPAM 0.5 MG: 2 INJECTION INTRAMUSCULAR; INTRAVENOUS at 02:10

## 2021-01-01 RX ADMIN — INSULIN ASPART 4 UNITS: 100 INJECTION, SOLUTION INTRAVENOUS; SUBCUTANEOUS at 01:09

## 2021-01-01 RX ADMIN — HEPARIN SODIUM AND DEXTROSE 16 UNITS/KG/HR: 10000; 5 INJECTION INTRAVENOUS at 01:10

## 2021-01-01 RX ADMIN — VASOPRESSIN 0.04 UNITS/MIN: 20 INJECTION INTRAVENOUS at 02:10

## 2021-01-01 RX ADMIN — HEPARIN SODIUM 15 UNITS/KG/HR: 10000 INJECTION, SOLUTION INTRAVENOUS at 11:10

## 2021-01-01 RX ADMIN — NOREPINEPHRINE BITARTRATE 0.02 MCG/KG/MIN: 1 INJECTION, SOLUTION, CONCENTRATE INTRAVENOUS at 11:09

## 2021-01-01 RX ADMIN — REMDESIVIR 100 MG: 100 INJECTION, POWDER, LYOPHILIZED, FOR SOLUTION INTRAVENOUS at 12:09

## 2021-01-01 RX ADMIN — NOREPINEPHRINE BITARTRATE 0.78 MCG/KG/MIN: 1 INJECTION, SOLUTION, CONCENTRATE INTRAVENOUS at 08:10

## 2021-01-01 RX ADMIN — CISATRACURIUM BESYLATE 3 MCG/KG/MIN: 10 INJECTION, SOLUTION INTRAVENOUS at 06:09

## 2021-01-01 RX ADMIN — DEXTROSE MONOHYDRATE 25 G: 25 INJECTION, SOLUTION INTRAVENOUS at 12:09

## 2021-01-01 RX ADMIN — LORAZEPAM 1 MG: 2 INJECTION INTRAMUSCULAR; INTRAVENOUS at 02:09

## 2021-01-01 RX ADMIN — BARICITINIB 1 MG: 1 TABLET, FILM COATED ORAL at 08:10

## 2021-01-01 RX ADMIN — NOREPINEPHRINE BITARTRATE: 1 INJECTION, SOLUTION, CONCENTRATE INTRAVENOUS at 01:09

## 2021-01-01 RX ADMIN — Medication 0.16 MCG/KG/MIN: at 05:10

## 2021-01-01 RX ADMIN — VASOPRESSIN 0.04 UNITS/MIN: 20 INJECTION INTRAVENOUS at 05:10

## 2021-01-01 RX ADMIN — PROPOFOL 50 MCG/KG/MIN: 10 INJECTION, EMULSION INTRAVENOUS at 01:09

## 2021-01-01 RX ADMIN — DEXTROSE 3.5 MCG/KG/MIN: 50 INJECTION, SOLUTION INTRAVENOUS at 11:10

## 2021-01-01 RX ADMIN — CEFTRIAXONE SODIUM 2 G: 2 INJECTION, SOLUTION INTRAVENOUS at 07:09

## 2021-01-01 RX ADMIN — TACROLIMUS 2 MG: 1 CAPSULE, GELATIN COATED ORAL at 09:10

## 2021-01-01 RX ADMIN — FENTANYL CITRATE 25 MCG: 50 INJECTION INTRAMUSCULAR; INTRAVENOUS at 02:10

## 2021-01-01 RX ADMIN — VASOPRESSIN 0.04 UNITS/MIN: 20 INJECTION INTRAVENOUS at 07:09

## 2021-01-01 RX ADMIN — HEPARIN SODIUM 24 UNITS/KG/HR: 10000 INJECTION, SOLUTION INTRAVENOUS at 10:10

## 2021-01-01 RX ADMIN — TAMSULOSIN HYDROCHLORIDE 0.4 MG: 0.4 CAPSULE ORAL at 08:09

## 2021-01-01 RX ADMIN — REMDESIVIR 100 MG: 100 INJECTION, POWDER, LYOPHILIZED, FOR SOLUTION INTRAVENOUS at 09:09

## 2021-01-01 RX ADMIN — INSULIN ASPART 2 UNITS: 100 INJECTION, SOLUTION INTRAVENOUS; SUBCUTANEOUS at 09:09

## 2021-01-01 RX ADMIN — MAGNESIUM SULFATE IN WATER 2 G: 40 INJECTION, SOLUTION INTRAVENOUS at 05:10

## 2021-01-01 RX ADMIN — INSULIN HUMAN 10 UNITS: 100 INJECTION, SOLUTION PARENTERAL at 09:09

## 2021-01-01 RX ADMIN — KETAMINE HYDROCHLORIDE 7.5 MCG/KG/MIN: 100 INJECTION INTRAMUSCULAR; INTRAVENOUS at 10:09

## 2021-01-01 RX ADMIN — NOREPINEPHRINE BITARTRATE 0 MCG/KG/MIN: 1 INJECTION, SOLUTION, CONCENTRATE INTRAVENOUS at 02:09

## 2021-01-01 RX ADMIN — PROPOFOL 50 MCG/KG/MIN: 10 INJECTION, EMULSION INTRAVENOUS at 10:10

## 2021-01-01 RX ADMIN — PROPOFOL 50 MCG/KG/MIN: 10 INJECTION, EMULSION INTRAVENOUS at 08:10

## 2021-01-01 RX ADMIN — IPRATROPIUM BROMIDE AND ALBUTEROL SULFATE 3 ML: 2.5; .5 SOLUTION RESPIRATORY (INHALATION) at 02:10

## 2021-01-01 RX ADMIN — PROPOFOL 50 MCG/KG/MIN: 10 INJECTION, EMULSION INTRAVENOUS at 03:09

## 2021-01-01 RX ADMIN — DEXTROSE 3.5 MCG/KG/MIN: 50 INJECTION, SOLUTION INTRAVENOUS at 10:10

## 2021-01-01 RX ADMIN — METOCLOPRAMIDE 10 MG: 5 INJECTION, SOLUTION INTRAMUSCULAR; INTRAVENOUS at 05:10

## 2021-01-01 RX ADMIN — INSULIN HUMAN 5 UNITS: 100 INJECTION, SOLUTION PARENTERAL at 12:09

## 2021-01-01 RX ADMIN — INSULIN ASPART 2 UNITS: 100 INJECTION, SOLUTION INTRAVENOUS; SUBCUTANEOUS at 09:10

## 2021-01-01 RX ADMIN — ROCURONIUM BROMIDE 57 MG: 10 INJECTION, SOLUTION INTRAVENOUS at 10:09

## 2021-01-01 RX ADMIN — TACROLIMUS 2 MG: 1 CAPSULE ORAL at 05:09

## 2021-01-01 RX ADMIN — VASOPRESSIN 0.04 UNITS/MIN: 20 INJECTION INTRAVENOUS at 11:10

## 2021-01-01 RX ADMIN — FUROSEMIDE 240 MG: 10 INJECTION, SOLUTION INTRAMUSCULAR; INTRAVENOUS at 06:10

## 2021-01-01 RX ADMIN — FUROSEMIDE 200 MG: 10 INJECTION, SOLUTION INTRAMUSCULAR; INTRAVENOUS at 05:10

## 2021-01-01 RX ADMIN — FUROSEMIDE 100 MG: 10 INJECTION, SOLUTION INTRAMUSCULAR; INTRAVENOUS at 12:10

## 2021-01-01 RX ADMIN — CISATRACURIUM BESYLATE 3 MCG/KG/MIN: 10 INJECTION, SOLUTION INTRAVENOUS at 04:10

## 2021-01-01 RX ADMIN — IPRATROPIUM BROMIDE AND ALBUTEROL SULFATE 3 ML: 2.5; .5 SOLUTION RESPIRATORY (INHALATION) at 12:10

## 2021-01-01 RX ADMIN — FENTANYL CITRATE 300 MCG/HR: 50 INJECTION INTRAVENOUS at 02:09

## 2021-01-01 RX ADMIN — LORAZEPAM 1 MG: 2 INJECTION INTRAMUSCULAR; INTRAVENOUS at 01:09

## 2021-01-01 RX ADMIN — MIDAZOLAM HYDROCHLORIDE 4 MG/HR: 5 INJECTION, SOLUTION INTRAMUSCULAR; INTRAVENOUS at 09:10

## 2021-01-01 RX ADMIN — HEPARIN SODIUM 21 UNITS/KG/HR: 10000 INJECTION, SOLUTION INTRAVENOUS at 04:10

## 2021-01-01 RX ADMIN — CISATRACURIUM BESYLATE 5 MCG/KG/MIN: 10 INJECTION, SOLUTION INTRAVENOUS at 03:10

## 2021-01-01 RX ADMIN — PROPOFOL 50 MCG/KG/MIN: 10 INJECTION, EMULSION INTRAVENOUS at 11:10

## 2021-01-01 RX ADMIN — FUROSEMIDE 200 MG: 10 INJECTION, SOLUTION INTRAMUSCULAR; INTRAVENOUS at 10:10

## 2021-01-01 RX ADMIN — CISATRACURIUM BESYLATE 19.4 MG: 2 INJECTION INTRAVENOUS at 10:10

## 2021-01-01 RX ADMIN — Medication 250 MCG/HR: at 02:09

## 2021-01-01 RX ADMIN — Medication 250 MCG/HR: at 06:09

## 2021-01-01 RX ADMIN — LACTULOSE 20 G: 10 SOLUTION ORAL at 12:10

## 2021-01-01 RX ADMIN — NOREPINEPHRINE BITARTRATE 0.07 MCG/KG/MIN: 1 INJECTION, SOLUTION, CONCENTRATE INTRAVENOUS at 01:09

## 2021-01-01 RX ADMIN — NOREPINEPHRINE BITARTRATE 0.14 MCG/KG/MIN: 1 INJECTION, SOLUTION, CONCENTRATE INTRAVENOUS at 05:10

## 2021-01-01 RX ADMIN — DEXTROSE MONOHYDRATE 25 G: 25 INJECTION, SOLUTION INTRAVENOUS at 09:09

## 2021-01-01 RX ADMIN — MIDAZOLAM HYDROCHLORIDE 4 MG/HR: 5 INJECTION, SOLUTION INTRAMUSCULAR; INTRAVENOUS at 11:10

## 2021-01-01 RX ADMIN — INSULIN HUMAN 7.25 UNITS/HR: 1 INJECTION, SOLUTION INTRAVENOUS at 08:09

## 2021-01-01 RX ADMIN — VASOPRESSIN 0.04 UNITS/MIN: 20 INJECTION INTRAVENOUS at 10:09

## 2021-01-01 RX ADMIN — Medication 100 MEQ: at 09:10

## 2021-01-01 RX ADMIN — METOROPROLOL TARTRATE 5 MG: 5 INJECTION, SOLUTION INTRAVENOUS at 04:10

## 2021-01-01 RX ADMIN — FENTANYL CITRATE 300 MCG/HR: 50 INJECTION INTRAVENOUS at 12:10

## 2021-01-01 RX ADMIN — POLYETHYLENE GLYCOL 3350 17 G: 17 POWDER, FOR SOLUTION ORAL at 08:10

## 2021-01-01 RX ADMIN — LEVOTHYROXINE SODIUM 125 MCG: 25 TABLET ORAL at 05:09

## 2021-01-01 RX ADMIN — MIDAZOLAM HYDROCHLORIDE 3 MG/HR: 5 INJECTION, SOLUTION INTRAMUSCULAR; INTRAVENOUS at 10:10

## 2021-01-01 RX ADMIN — INSULIN ASPART 2 UNITS: 100 INJECTION, SOLUTION INTRAVENOUS; SUBCUTANEOUS at 02:10

## 2021-01-01 RX ADMIN — FUROSEMIDE 60 MG: 20 INJECTION, SOLUTION INTRAMUSCULAR; INTRAVENOUS at 10:09

## 2021-01-01 RX ADMIN — PANTOPRAZOLE SODIUM 40 MG: 40 GRANULE, DELAYED RELEASE ORAL at 08:10

## 2021-01-01 RX ADMIN — CISATRACURIUM BESYLATE 3 MCG/KG/MIN: 10 INJECTION, SOLUTION INTRAVENOUS at 03:09

## 2021-01-01 RX ADMIN — LORAZEPAM 1 MG: 2 INJECTION INTRAMUSCULAR; INTRAVENOUS at 09:09

## 2021-01-01 RX ADMIN — PROPOFOL 1000 MG: 10 INJECTION, EMULSION INTRAVENOUS at 09:09

## 2021-01-01 RX ADMIN — HEPARIN SODIUM 5000 UNITS: 5000 INJECTION INTRAVENOUS; SUBCUTANEOUS at 10:09

## 2021-01-01 RX ADMIN — NITROGLYCERIN 0.5 INCH: 20 OINTMENT TOPICAL at 06:10

## 2021-01-01 RX ADMIN — MINERAL OIL AND WHITE PETROLATUM: 150; 830 OINTMENT OPHTHALMIC at 03:09

## 2021-01-01 RX ADMIN — MINERAL OIL, PETROLATUM: 425; 573 OINTMENT OPHTHALMIC at 09:09

## 2021-01-01 RX ADMIN — FUROSEMIDE 200 MG: 10 INJECTION, SOLUTION INTRAMUSCULAR; INTRAVENOUS at 09:10

## 2021-01-01 RX ADMIN — INSULIN ASPART 2 UNITS: 100 INJECTION, SOLUTION INTRAVENOUS; SUBCUTANEOUS at 03:10

## 2021-01-01 RX ADMIN — INSULIN ASPART 4 UNITS: 100 INJECTION, SOLUTION INTRAVENOUS; SUBCUTANEOUS at 05:09

## 2021-01-01 RX ADMIN — INSULIN ASPART 4 UNITS: 100 INJECTION, SOLUTION INTRAVENOUS; SUBCUTANEOUS at 08:09

## 2021-01-01 RX ADMIN — INSULIN HUMAN 1.6 UNITS/HR: 1 INJECTION, SOLUTION INTRAVENOUS at 06:09

## 2021-01-01 RX ADMIN — METOPROLOL TARTRATE 5 MG: 1 INJECTION, SOLUTION INTRAVENOUS at 05:09

## 2021-01-01 RX ADMIN — BARICITINIB 2 MG: 2 TABLET, FILM COATED ORAL at 09:09

## 2021-01-01 RX ADMIN — HEPARIN SODIUM AND DEXTROSE 12 UNITS/KG/HR: 10000; 5 INJECTION INTRAVENOUS at 06:09

## 2021-01-01 RX ADMIN — MINERAL OIL, PETROLATUM: 425; 573 OINTMENT OPHTHALMIC at 02:09

## 2021-01-01 RX ADMIN — DIGOXIN 125 MCG: 0.25 INJECTION INTRAMUSCULAR; INTRAVENOUS at 09:10

## 2021-01-01 RX ADMIN — INSULIN ASPART 4 UNITS: 100 INJECTION, SOLUTION INTRAVENOUS; SUBCUTANEOUS at 12:10

## 2021-01-01 RX ADMIN — Medication 250 MCG/HR: at 04:09

## 2021-01-01 RX ADMIN — NITROGLYCERIN 0.5 INCH: 20 OINTMENT TOPICAL at 11:10

## 2021-01-01 RX ADMIN — INSULIN ASPART 2 UNITS: 100 INJECTION, SOLUTION INTRAVENOUS; SUBCUTANEOUS at 11:09

## 2021-01-01 RX ADMIN — LORAZEPAM 1 MG: 0.5 TABLET ORAL at 10:09

## 2021-01-01 RX ADMIN — HEPARIN SODIUM AND DEXTROSE 14 UNITS/KG/HR: 10000; 5 INJECTION INTRAVENOUS at 05:10

## 2021-01-01 RX ADMIN — DEXMEDETOMIDINE HYDROCHLORIDE 1.4 MCG/KG/HR: 4 INJECTION, SOLUTION INTRAVENOUS at 04:09

## 2021-01-01 RX ADMIN — INSULIN ASPART 1 UNITS: 100 INJECTION, SOLUTION INTRAVENOUS; SUBCUTANEOUS at 09:10

## 2021-01-01 RX ADMIN — DEXMEDETOMIDINE HYDROCHLORIDE 1.4 MCG/KG/HR: 4 INJECTION, SOLUTION INTRAVENOUS at 06:09

## 2021-01-01 RX ADMIN — HEPARIN SODIUM 5000 UNITS: 5000 INJECTION INTRAVENOUS; SUBCUTANEOUS at 08:09

## 2021-01-01 RX ADMIN — BUDESONIDE 0.25 MG: 0.25 SUSPENSION RESPIRATORY (INHALATION) at 08:10

## 2021-01-01 RX ADMIN — METOCLOPRAMIDE 10 MG: 5 INJECTION, SOLUTION INTRAMUSCULAR; INTRAVENOUS at 06:10

## 2021-01-01 RX ADMIN — CISATRACURIUM BESYLATE 5 MCG/KG/MIN: 10 INJECTION, SOLUTION INTRAVENOUS at 11:10

## 2021-01-01 RX ADMIN — PROPOFOL 50 MCG/KG/MIN: 10 INJECTION, EMULSION INTRAVENOUS at 08:09

## 2021-01-01 RX ADMIN — FENTANYL CITRATE 275 MCG/HR: 50 INJECTION INTRAVENOUS at 10:09

## 2021-01-01 RX ADMIN — METOCLOPRAMIDE 10 MG: 5 INJECTION, SOLUTION INTRAMUSCULAR; INTRAVENOUS at 12:10

## 2021-01-01 RX ADMIN — Medication 50 MEQ: at 11:10

## 2021-01-01 RX ADMIN — FUROSEMIDE 80 MG: 10 INJECTION, SOLUTION INTRAMUSCULAR; INTRAVENOUS at 05:10

## 2021-01-01 RX ADMIN — DEXMEDETOMIDINE HYDROCHLORIDE 0.9 MCG/KG/HR: 4 INJECTION, SOLUTION INTRAVENOUS at 08:09

## 2021-01-01 RX ADMIN — Medication 250 MCG/HR: at 10:09

## 2021-01-01 RX ADMIN — KETAMINE HYDROCHLORIDE 2.5 MCG/KG/MIN: 100 INJECTION INTRAMUSCULAR; INTRAVENOUS at 11:09

## 2021-01-01 RX ADMIN — HEPARIN SODIUM 21 UNITS/KG/HR: 10000 INJECTION, SOLUTION INTRAVENOUS at 05:10

## 2021-01-01 RX ADMIN — AZITHROMYCIN MONOHYDRATE 500 MG: 500 INJECTION, POWDER, LYOPHILIZED, FOR SOLUTION INTRAVENOUS at 11:09

## 2021-01-01 RX ADMIN — NOREPINEPHRINE BITARTRATE 0.97 MCG/KG/MIN: 1 INJECTION, SOLUTION, CONCENTRATE INTRAVENOUS at 02:10

## 2021-01-01 RX ADMIN — PROPOFOL 50 MCG/KG/MIN: 10 INJECTION, EMULSION INTRAVENOUS at 12:09

## 2021-01-01 RX ADMIN — FAMOTIDINE 20 MG: 20 TABLET ORAL at 09:09

## 2021-01-01 RX ADMIN — HEPARIN SODIUM 19 UNITS/KG/HR: 10000 INJECTION, SOLUTION INTRAVENOUS at 01:10

## 2021-01-01 RX ADMIN — SODIUM CHLORIDE 1000 ML: 0.9 INJECTION, SOLUTION INTRAVENOUS at 09:09

## 2021-01-01 RX ADMIN — VASOPRESSIN 0.04 UNITS/MIN: 20 INJECTION INTRAVENOUS at 06:10

## 2021-01-01 RX ADMIN — FENTANYL CITRATE 300 MCG/HR: 50 INJECTION INTRAVENOUS at 06:10

## 2021-01-01 RX ADMIN — TACROLIMUS 2 MG: 1 CAPSULE, GELATIN COATED ORAL at 06:10

## 2021-01-01 RX ADMIN — POTASSIUM CHLORIDE 20 MEQ: 200 INJECTION, SOLUTION INTRAVENOUS at 06:10

## 2021-01-01 RX ADMIN — HEPARIN SODIUM AND DEXTROSE 12 UNITS/KG/HR: 10000; 5 INJECTION INTRAVENOUS at 01:09

## 2021-01-01 RX ADMIN — MIDAZOLAM HYDROCHLORIDE 4 MG: 1 INJECTION, SOLUTION INTRAMUSCULAR; INTRAVENOUS at 05:09

## 2021-01-01 RX ADMIN — SODIUM ZIRCONIUM CYCLOSILICATE 10 G: 10 POWDER, FOR SUSPENSION ORAL at 08:09

## 2021-01-01 RX ADMIN — CALCIUM GLUCONATE 1 G: 98 INJECTION, SOLUTION INTRAVENOUS at 03:10

## 2021-01-01 RX ADMIN — MUPIROCIN: 20 OINTMENT TOPICAL at 10:09

## 2021-01-01 RX ADMIN — INSULIN ASPART 6 UNITS: 100 INJECTION, SOLUTION INTRAVENOUS; SUBCUTANEOUS at 06:09

## 2021-01-01 RX ADMIN — KETAMINE HYDROCHLORIDE 7.5 MCG/KG/MIN: 100 INJECTION INTRAMUSCULAR; INTRAVENOUS at 05:09

## 2021-01-01 RX ADMIN — PROPOFOL 50 MCG/KG/MIN: 10 INJECTION, EMULSION INTRAVENOUS at 07:10

## 2021-01-01 RX ADMIN — AMIODARONE HYDROCHLORIDE 0.5 MG/MIN: 1.8 INJECTION, SOLUTION INTRAVENOUS at 04:09

## 2021-01-01 RX ADMIN — FUROSEMIDE 120 MG: 10 INJECTION, SOLUTION INTRAMUSCULAR; INTRAVENOUS at 05:10

## 2021-01-01 RX ADMIN — DEXAMETHASONE SODIUM PHOSPHATE 6 MG: 4 INJECTION INTRA-ARTICULAR; INTRALESIONAL; INTRAMUSCULAR; INTRAVENOUS; SOFT TISSUE at 02:10

## 2021-01-01 RX ADMIN — INSULIN ASPART 6 UNITS: 100 INJECTION, SOLUTION INTRAVENOUS; SUBCUTANEOUS at 11:09

## 2021-01-01 RX ADMIN — PANTOPRAZOLE SODIUM 40 MG: 40 TABLET, DELAYED RELEASE ORAL at 12:10

## 2021-01-01 RX ADMIN — METOROPROLOL TARTRATE 5 MG: 5 INJECTION, SOLUTION INTRAVENOUS at 04:09

## 2021-01-01 RX ADMIN — TACROLIMUS 2 MG: 1 CAPSULE ORAL at 06:09

## 2021-01-01 RX ADMIN — IPRATROPIUM BROMIDE AND ALBUTEROL SULFATE 3 ML: 2.5; .5 SOLUTION RESPIRATORY (INHALATION) at 07:10

## 2021-01-01 RX ADMIN — INSULIN ASPART 3 UNITS: 100 INJECTION, SOLUTION INTRAVENOUS; SUBCUTANEOUS at 12:10

## 2021-01-01 RX ADMIN — ENOXAPARIN SODIUM 100 MG: 100 INJECTION, SOLUTION INTRAVENOUS; SUBCUTANEOUS at 06:09

## 2021-01-01 RX ADMIN — MIDAZOLAM HYDROCHLORIDE 1 MG/HR: 5 INJECTION, SOLUTION INTRAMUSCULAR; INTRAVENOUS at 11:09

## 2021-01-01 RX ADMIN — INSULIN ASPART 3 UNITS: 100 INJECTION, SOLUTION INTRAVENOUS; SUBCUTANEOUS at 09:09

## 2021-01-01 RX ADMIN — CISATRACURIUM BESYLATE 6 MCG/KG/MIN: 10 INJECTION, SOLUTION INTRAVENOUS at 04:10

## 2021-01-01 RX ADMIN — POTASSIUM CHLORIDE 40 MEQ: 1500 TABLET, EXTENDED RELEASE ORAL at 06:10

## 2021-01-01 RX ADMIN — CISATRACURIUM BESYLATE 4 MCG/KG/MIN: 10 INJECTION, SOLUTION INTRAVENOUS at 04:10

## 2021-01-01 RX ADMIN — HYDROCORTISONE SODIUM SUCCINATE 50 MG: 100 INJECTION, POWDER, FOR SOLUTION INTRAMUSCULAR; INTRAVENOUS at 05:10

## 2021-01-01 RX ADMIN — MIDAZOLAM HYDROCHLORIDE 1 MG: 1 INJECTION INTRAMUSCULAR; INTRAVENOUS at 09:09

## 2021-01-01 RX ADMIN — INSULIN ASPART 8 UNITS: 100 INJECTION, SOLUTION INTRAVENOUS; SUBCUTANEOUS at 09:09

## 2021-01-01 RX ADMIN — SENNOSIDES AND DOCUSATE SODIUM 2 TABLET: 8.6; 5 TABLET ORAL at 03:09

## 2021-01-01 RX ADMIN — ROCURONIUM BROMIDE 100 MG: 10 INJECTION, SOLUTION INTRAVENOUS at 09:09

## 2021-01-01 RX ADMIN — INSULIN HUMAN 4 UNITS/HR: 1 INJECTION, SOLUTION INTRAVENOUS at 01:09

## 2021-01-01 RX ADMIN — CISATRACURIUM BESYLATE 6 MCG/KG/MIN: 10 INJECTION, SOLUTION INTRAVENOUS at 10:10

## 2021-01-01 RX ADMIN — AMIODARONE HYDROCHLORIDE 0.5 MG/MIN: 1.8 INJECTION, SOLUTION INTRAVENOUS at 08:10

## 2021-01-01 RX ADMIN — MAGNESIUM SULFATE IN WATER 2 G: 40 INJECTION, SOLUTION INTRAVENOUS at 04:10

## 2021-01-01 RX ADMIN — CISATRACURIUM BESYLATE 4 MCG/KG/MIN: 10 INJECTION, SOLUTION INTRAVENOUS at 02:10

## 2021-01-01 RX ADMIN — FAMOTIDINE 20 MG: 10 INJECTION INTRAVENOUS at 09:10

## 2021-01-01 RX ADMIN — INDOMETHACIN 50 MEQ: 25 CAPSULE ORAL at 11:10

## 2021-01-01 RX ADMIN — AMIODARONE HYDROCHLORIDE 1 MG/MIN: 1.8 INJECTION, SOLUTION INTRAVENOUS at 10:09

## 2021-01-01 RX ADMIN — ZINC SULFATE 220 MG (50 MG) CAPSULE 220 MG: CAPSULE at 09:10

## 2021-01-01 RX ADMIN — Medication 250 MCG/HR: at 05:09

## 2021-01-01 RX ADMIN — VASOPRESSIN 0.04 UNITS/MIN: 20 INJECTION INTRAVENOUS at 09:10

## 2021-01-01 RX ADMIN — HEPARIN SODIUM 21 UNITS/KG/HR: 10000 INJECTION, SOLUTION INTRAVENOUS at 03:10

## 2021-01-01 RX ADMIN — AMIODARONE HYDROCHLORIDE 0.5 MG/MIN: 1.8 INJECTION, SOLUTION INTRAVENOUS at 09:10

## 2021-01-01 RX ADMIN — FUROSEMIDE 120 MG: 10 INJECTION, SOLUTION INTRAMUSCULAR; INTRAVENOUS at 08:10

## 2021-01-01 RX ADMIN — SODIUM CHLORIDE: 0.9 INJECTION, SOLUTION INTRAVENOUS at 09:10

## 2021-01-01 RX ADMIN — HEPARIN SODIUM 5000 UNITS: 5000 INJECTION INTRAVENOUS; SUBCUTANEOUS at 04:09

## 2021-01-01 RX ADMIN — PANTOPRAZOLE SODIUM 40 MG: 40 TABLET, DELAYED RELEASE ORAL at 08:10

## 2021-01-01 RX ADMIN — REMDESIVIR 200 MG: 100 INJECTION, POWDER, LYOPHILIZED, FOR SOLUTION INTRAVENOUS at 07:09

## 2021-01-01 RX ADMIN — HEPARIN SODIUM AND DEXTROSE 16 UNITS/KG/HR: 10000; 5 INJECTION INTRAVENOUS at 11:10

## 2021-01-01 RX ADMIN — INDOMETHACIN 100 MEQ: 25 CAPSULE ORAL at 02:10

## 2021-01-01 RX ADMIN — INSULIN DETEMIR 20 UNITS: 100 INJECTION, SOLUTION SUBCUTANEOUS at 10:09

## 2021-01-01 RX ADMIN — OXYCODONE HYDROCHLORIDE AND ACETAMINOPHEN 500 MG: 500 TABLET ORAL at 09:09

## 2021-01-01 RX ADMIN — AZITHROMYCIN MONOHYDRATE 500 MG: 500 INJECTION, POWDER, LYOPHILIZED, FOR SOLUTION INTRAVENOUS at 10:09

## 2021-01-01 RX ADMIN — LORAZEPAM 1 MG: 2 INJECTION INTRAMUSCULAR; INTRAVENOUS at 03:09

## 2021-01-01 RX ADMIN — METOPROLOL TARTRATE 25 MG: 25 TABLET, FILM COATED ORAL at 09:09

## 2021-01-01 RX ADMIN — NOREPINEPHRINE BITARTRATE 0.05 MCG/KG/MIN: 1 INJECTION, SOLUTION, CONCENTRATE INTRAVENOUS at 02:09

## 2021-01-01 RX ADMIN — INSULIN ASPART 6 UNITS: 100 INJECTION, SOLUTION INTRAVENOUS; SUBCUTANEOUS at 04:09

## 2021-01-01 RX ADMIN — NOREPINEPHRINE BITARTRATE 0.07 MCG/KG/MIN: 1 INJECTION, SOLUTION, CONCENTRATE INTRAVENOUS at 03:09

## 2021-01-01 RX ADMIN — HEPARIN SODIUM AND DEXTROSE 16 UNITS/KG/HR: 10000; 5 INJECTION INTRAVENOUS at 06:10

## 2021-01-01 RX ADMIN — DIGOXIN 500 MCG: 0.25 INJECTION INTRAMUSCULAR; INTRAVENOUS at 04:10

## 2021-01-01 RX ADMIN — PROPOFOL 50 MCG/KG/MIN: 10 INJECTION, EMULSION INTRAVENOUS at 02:10

## 2021-01-01 RX ADMIN — FUROSEMIDE 80 MG: 10 INJECTION, SOLUTION INTRAMUSCULAR; INTRAVENOUS at 11:09

## 2021-01-01 RX ADMIN — Medication 1000 UNITS: at 09:09

## 2021-01-01 RX ADMIN — SODIUM ZIRCONIUM CYCLOSILICATE 10 G: 10 POWDER, FOR SUSPENSION ORAL at 05:09

## 2021-01-01 RX ADMIN — CEFTRIAXONE SODIUM 2 G: 2 INJECTION, SOLUTION INTRAVENOUS at 02:09

## 2021-01-01 RX ADMIN — NOREPINEPHRINE BITARTRATE 0.06 MCG/KG/MIN: 1 INJECTION, SOLUTION, CONCENTRATE INTRAVENOUS at 03:09

## 2021-01-01 RX ADMIN — FUROSEMIDE 80 MG: 10 INJECTION, SOLUTION INTRAMUSCULAR; INTRAVENOUS at 12:10

## 2021-01-01 RX ADMIN — SODIUM ZIRCONIUM CYCLOSILICATE 10 G: 10 POWDER, FOR SUSPENSION ORAL at 08:10

## 2021-01-01 RX ADMIN — AZITHROMYCIN MONOHYDRATE 500 MG: 500 INJECTION, POWDER, LYOPHILIZED, FOR SOLUTION INTRAVENOUS at 09:09

## 2021-01-01 RX ADMIN — DEXMEDETOMIDINE HYDROCHLORIDE 1 MCG/KG/HR: 4 INJECTION, SOLUTION INTRAVENOUS at 10:09

## 2021-01-01 RX ADMIN — ENOXAPARIN SODIUM 100 MG: 100 INJECTION SUBCUTANEOUS at 10:09

## 2021-01-01 RX ADMIN — VASOPRESSIN 0.04 UNITS/MIN: 20 INJECTION INTRAVENOUS at 03:10

## 2021-01-01 RX ADMIN — KETAMINE HYDROCHLORIDE 7.5 MCG/KG/MIN: 100 INJECTION INTRAMUSCULAR; INTRAVENOUS at 06:09

## 2021-01-01 RX ADMIN — FAMOTIDINE 20 MG: 20 TABLET ORAL at 10:09

## 2021-01-01 RX ADMIN — INSULIN ASPART 8 UNITS: 100 INJECTION, SOLUTION INTRAVENOUS; SUBCUTANEOUS at 12:09

## 2021-01-01 RX ADMIN — Medication 1000 UNITS: at 09:10

## 2021-01-01 RX ADMIN — MIDAZOLAM 1 MG: 1 INJECTION INTRAMUSCULAR; INTRAVENOUS at 09:09

## 2021-01-01 RX ADMIN — TACROLIMUS 1 MG: 1 CAPSULE ORAL at 08:09

## 2021-01-01 RX ADMIN — FUROSEMIDE 100 MG: 10 INJECTION, SOLUTION INTRAMUSCULAR; INTRAVENOUS at 06:10

## 2021-01-01 RX ADMIN — CISATRACURIUM BESYLATE 0.5 MCG/KG/MIN: 10 INJECTION, SOLUTION INTRAVENOUS at 06:09

## 2021-01-01 RX ADMIN — AMIODARONE HYDROCHLORIDE 1 MG/MIN: 1.8 INJECTION, SOLUTION INTRAVENOUS at 06:09

## 2021-01-01 RX ADMIN — FUROSEMIDE 80 MG: 10 INJECTION, SOLUTION INTRAMUSCULAR; INTRAVENOUS at 12:09

## 2021-01-01 RX ADMIN — MUPIROCIN: 20 OINTMENT TOPICAL at 11:09

## 2021-01-01 RX ADMIN — HEPARIN SODIUM 5000 UNITS: 5000 INJECTION INTRAVENOUS; SUBCUTANEOUS at 02:09

## 2021-01-01 RX ADMIN — FUROSEMIDE 200 MG: 10 INJECTION, SOLUTION INTRAMUSCULAR; INTRAVENOUS at 06:10

## 2021-01-01 RX ADMIN — FENTANYL CITRATE 300 MCG/HR: 50 INJECTION INTRAVENOUS at 08:09

## 2021-01-01 RX ADMIN — INSULIN ASPART 2 UNITS: 100 INJECTION, SOLUTION INTRAVENOUS; SUBCUTANEOUS at 01:10

## 2021-01-01 RX ADMIN — FAMOTIDINE 20 MG: 10 INJECTION INTRAVENOUS at 09:09

## 2021-01-01 RX ADMIN — CISATRACURIUM BESYLATE 2 MCG/KG/MIN: 10 INJECTION, SOLUTION INTRAVENOUS at 08:09

## 2021-01-01 RX ADMIN — VANCOMYCIN HYDROCHLORIDE 2000 MG: 10 INJECTION, POWDER, LYOPHILIZED, FOR SOLUTION INTRAVENOUS at 10:10

## 2021-01-01 RX ADMIN — INDOMETHACIN 100 MEQ: 25 CAPSULE ORAL at 09:10

## 2021-01-01 RX ADMIN — CALCIUM GLUCONATE 1 G: 98 INJECTION, SOLUTION INTRAVENOUS at 03:09

## 2021-01-01 RX ADMIN — CEFTRIAXONE SODIUM 2 G: 2 INJECTION, SOLUTION INTRAVENOUS at 04:09

## 2021-01-01 RX ADMIN — FENTANYL CITRATE 300 MCG/HR: 50 INJECTION INTRAVENOUS at 09:10

## 2021-01-01 RX ADMIN — NOREPINEPHRINE BITARTRATE 0.16 MCG/KG/MIN: 1 INJECTION, SOLUTION, CONCENTRATE INTRAVENOUS at 10:09

## 2021-01-01 RX ADMIN — OLANZAPINE 5 MG: 10 INJECTION, POWDER, LYOPHILIZED, FOR SOLUTION INTRAMUSCULAR at 05:09

## 2021-01-01 RX ADMIN — OXYCODONE HYDROCHLORIDE AND ACETAMINOPHEN 500 MG: 500 TABLET ORAL at 12:09

## 2021-01-01 RX ADMIN — MIDAZOLAM HYDROCHLORIDE 4 MG/HR: 5 INJECTION, SOLUTION INTRAMUSCULAR; INTRAVENOUS at 10:10

## 2021-01-01 RX ADMIN — HEPARIN SODIUM 19 UNITS/KG/HR: 10000 INJECTION, SOLUTION INTRAVENOUS at 10:10

## 2021-01-01 RX ADMIN — Medication 0.07 MCG/KG/MIN: at 01:10

## 2021-01-01 RX ADMIN — INSULIN HUMAN 9.53 UNITS: 100 INJECTION, SOLUTION PARENTERAL at 10:09

## 2021-01-01 RX ADMIN — NOREPINEPHRINE BITARTRATE 0.12 MCG/KG/MIN: 1 INJECTION, SOLUTION, CONCENTRATE INTRAVENOUS at 06:09

## 2021-01-01 RX ADMIN — LORAZEPAM 1 MG: 2 INJECTION INTRAMUSCULAR; INTRAVENOUS at 05:09

## 2021-01-01 RX ADMIN — CISATRACURIUM BESYLATE 4.5 MCG/KG/MIN: 10 INJECTION, SOLUTION INTRAVENOUS at 12:10

## 2021-01-01 RX ADMIN — HYDROCORTISONE SODIUM SUCCINATE 50 MG: 100 INJECTION, POWDER, FOR SOLUTION INTRAMUSCULAR; INTRAVENOUS at 09:10

## 2021-01-01 RX ADMIN — HEPARIN SODIUM 12 UNITS/KG/HR: 10000 INJECTION, SOLUTION INTRAVENOUS at 12:10

## 2021-01-01 RX ADMIN — NOREPINEPHRINE BITARTRATE 0.09 MCG/KG/MIN: 1 INJECTION, SOLUTION, CONCENTRATE INTRAVENOUS at 07:09

## 2021-01-01 RX ADMIN — HYDROCORTISONE SODIUM SUCCINATE 50 MG: 100 INJECTION, POWDER, FOR SOLUTION INTRAMUSCULAR; INTRAVENOUS at 02:10

## 2021-01-01 RX ADMIN — CISATRACURIUM BESYLATE 6 MCG/KG/MIN: 10 INJECTION, SOLUTION INTRAVENOUS at 09:10

## 2021-01-01 RX ADMIN — INSULIN ASPART 2 UNITS: 100 INJECTION, SOLUTION INTRAVENOUS; SUBCUTANEOUS at 11:10

## 2021-01-01 RX ADMIN — CISATRACURIUM BESYLATE 4.5 MCG/KG/MIN: 10 INJECTION, SOLUTION INTRAVENOUS at 08:10

## 2021-01-01 RX ADMIN — METOPROLOL TARTRATE 50 MG: 50 TABLET, FILM COATED ORAL at 08:09

## 2021-01-01 RX ADMIN — TACROLIMUS 2 MG: 1 CAPSULE, GELATIN COATED ORAL at 05:09

## 2021-01-01 RX ADMIN — NICARDIPINE HYDROCHLORIDE 2.5 MG/HR: 0.2 INJECTION INTRAVENOUS at 08:09

## 2021-01-01 RX ADMIN — MIDAZOLAM HYDROCHLORIDE 4 MG/HR: 5 INJECTION, SOLUTION INTRAMUSCULAR; INTRAVENOUS at 06:09

## 2021-01-01 RX ADMIN — FUROSEMIDE 120 MG: 10 INJECTION, SOLUTION INTRAMUSCULAR; INTRAVENOUS at 11:09

## 2021-01-01 RX ADMIN — POTASSIUM CHLORIDE 20 MEQ: 200 INJECTION, SOLUTION INTRAVENOUS at 04:10

## 2021-01-01 RX ADMIN — LEVOTHYROXINE SODIUM 62.6 MCG: 20 INJECTION, SOLUTION INTRAVENOUS at 08:09

## 2021-01-01 RX ADMIN — DEXMEDETOMIDINE HYDROCHLORIDE 1.4 MCG/KG/HR: 4 INJECTION, SOLUTION INTRAVENOUS at 02:09

## 2021-01-01 RX ADMIN — IPRATROPIUM BROMIDE AND ALBUTEROL SULFATE 3 ML: 2.5; .5 SOLUTION RESPIRATORY (INHALATION) at 02:09

## 2021-01-01 RX ADMIN — CISATRACURIUM BESYLATE 3 MCG/KG/MIN: 10 INJECTION, SOLUTION INTRAVENOUS at 02:10

## 2021-01-01 RX ADMIN — METOCLOPRAMIDE 10 MG: 5 INJECTION, SOLUTION INTRAMUSCULAR; INTRAVENOUS at 01:10

## 2021-01-01 RX ADMIN — FENTANYL CITRATE 300 MCG/HR: 50 INJECTION INTRAVENOUS at 02:10

## 2021-01-01 RX ADMIN — FENTANYL CITRATE 300 MCG/HR: 50 INJECTION INTRAVENOUS at 11:09

## 2021-01-01 RX ADMIN — LEVOTHYROXINE SODIUM 125 MCG: 25 TABLET ORAL at 09:09

## 2021-01-01 RX ADMIN — CEFTRIAXONE 1 G: 1 INJECTION, SOLUTION INTRAVENOUS at 08:09

## 2021-01-01 RX ADMIN — Medication 25 MCG/HR: at 10:09

## 2021-01-01 RX ADMIN — SODIUM ZIRCONIUM CYCLOSILICATE 5 G: 5 POWDER, FOR SUSPENSION ORAL at 04:09

## 2021-01-01 RX ADMIN — POLYETHYLENE GLYCOL 3350 17 G: 17 POWDER, FOR SOLUTION ORAL at 03:09

## 2021-01-01 RX ADMIN — INSULIN ASPART 6 UNITS: 100 INJECTION, SOLUTION INTRAVENOUS; SUBCUTANEOUS at 03:10

## 2021-01-01 RX ADMIN — MIDAZOLAM HYDROCHLORIDE 4 MG/HR: 5 INJECTION, SOLUTION INTRAMUSCULAR; INTRAVENOUS at 02:10

## 2021-01-01 RX ADMIN — PROPOFOL 50 MCG/KG/MIN: 10 INJECTION, EMULSION INTRAVENOUS at 10:09

## 2021-01-01 RX ADMIN — VASOPRESSIN 0.04 UNITS/MIN: 20 INJECTION INTRAVENOUS at 03:09

## 2021-01-01 RX ADMIN — VASOPRESSIN 0.04 UNITS/MIN: 20 INJECTION INTRAVENOUS at 12:09

## 2021-01-01 RX ADMIN — LEVOTHYROXINE SODIUM 125 MCG: 25 TABLET ORAL at 11:09

## 2021-01-01 RX ADMIN — SODIUM ZIRCONIUM CYCLOSILICATE 10 G: 10 POWDER, FOR SUSPENSION ORAL at 11:09

## 2021-01-01 RX ADMIN — SODIUM ZIRCONIUM CYCLOSILICATE 10 G: 10 POWDER, FOR SUSPENSION ORAL at 02:10

## 2021-01-01 RX ADMIN — AMIODARONE HYDROCHLORIDE 200 MG: 200 TABLET ORAL at 02:10

## 2021-01-01 RX ADMIN — Medication 1.2 MCG/KG/MIN: at 02:09

## 2021-01-01 RX ADMIN — BARICITINIB 2 MG: 2 TABLET, FILM COATED ORAL at 10:09

## 2021-01-01 RX ADMIN — Medication 250 MCG/HR: at 08:09

## 2021-01-01 RX ADMIN — FUROSEMIDE 240 MG: 10 INJECTION, SOLUTION INTRAMUSCULAR; INTRAVENOUS at 01:10

## 2021-01-01 RX ADMIN — INSULIN ASPART 2 UNITS: 100 INJECTION, SOLUTION INTRAVENOUS; SUBCUTANEOUS at 01:09

## 2021-01-01 RX ADMIN — HEPARIN SODIUM AND DEXTROSE 16 UNITS/KG/HR: 10000; 5 INJECTION INTRAVENOUS at 07:10

## 2021-01-01 RX ADMIN — NOREPINEPHRINE BITARTRATE 0.06 MCG/KG/MIN: 1 INJECTION, SOLUTION, CONCENTRATE INTRAVENOUS at 07:09

## 2021-01-01 RX ADMIN — SODIUM CHLORIDE, SODIUM LACTATE, POTASSIUM CHLORIDE, AND CALCIUM CHLORIDE 500 ML: .6; .31; .03; .02 INJECTION, SOLUTION INTRAVENOUS at 02:09

## 2021-01-01 RX ADMIN — CHLOROTHIAZIDE SODIUM 500 MG: 500 INJECTION, POWDER, LYOPHILIZED, FOR SOLUTION INTRAVENOUS at 01:10

## 2021-01-01 RX ADMIN — FUROSEMIDE 240 MG: 10 INJECTION, SOLUTION INTRAMUSCULAR; INTRAVENOUS at 02:10

## 2021-01-01 RX ADMIN — ZINC SULFATE 220 MG (50 MG) CAPSULE 220 MG: CAPSULE at 09:09

## 2021-01-01 RX ADMIN — CISATRACURIUM BESYLATE 5 MCG/KG/MIN: 10 INJECTION, SOLUTION INTRAVENOUS at 07:10

## 2021-01-01 RX ADMIN — MIDAZOLAM HYDROCHLORIDE 2 MG/HR: 5 INJECTION, SOLUTION INTRAMUSCULAR; INTRAVENOUS at 01:09

## 2021-01-01 RX ADMIN — CISATRACURIUM BESYLATE 2 MCG/KG/MIN: 10 INJECTION, SOLUTION INTRAVENOUS at 11:09

## 2021-01-01 RX ADMIN — INSULIN ASPART 6 UNITS: 100 INJECTION, SOLUTION INTRAVENOUS; SUBCUTANEOUS at 09:09

## 2021-01-01 RX ADMIN — CISATRACURIUM BESYLATE 2 MCG/KG/MIN: 10 INJECTION, SOLUTION INTRAVENOUS at 01:09

## 2021-01-01 RX ADMIN — INSULIN ASPART 3 UNITS: 100 INJECTION, SOLUTION INTRAVENOUS; SUBCUTANEOUS at 02:09

## 2021-01-01 RX ADMIN — SODIUM CHLORIDE: 0.9 INJECTION, SOLUTION INTRAVENOUS at 02:10

## 2021-01-01 RX ADMIN — Medication 0.04 MCG/KG/MIN: at 11:10

## 2021-01-01 RX ADMIN — INSULIN ASPART 1 UNITS: 100 INJECTION, SOLUTION INTRAVENOUS; SUBCUTANEOUS at 07:10

## 2021-01-01 RX ADMIN — MIDAZOLAM HYDROCHLORIDE 4 MG/HR: 5 INJECTION, SOLUTION INTRAMUSCULAR; INTRAVENOUS at 06:10

## 2021-01-01 RX ADMIN — Medication 220 MG: at 12:09

## 2021-01-01 RX ADMIN — INSULIN ASPART 10 UNITS: 100 INJECTION, SOLUTION INTRAVENOUS; SUBCUTANEOUS at 05:09

## 2021-01-01 RX ADMIN — Medication 0.1 MCG/KG/MIN: at 05:10

## 2021-01-01 RX ADMIN — METOPROLOL TARTRATE 50 MG: 50 TABLET, FILM COATED ORAL at 10:09

## 2021-01-01 RX ADMIN — VASOPRESSIN 0.04 UNITS/MIN: 20 INJECTION INTRAVENOUS at 07:10

## 2021-01-01 RX ADMIN — INSULIN DETEMIR 20 UNITS: 100 INJECTION, SOLUTION SUBCUTANEOUS at 08:09

## 2021-01-01 RX ADMIN — INSULIN ASPART 6 UNITS: 100 INJECTION, SOLUTION INTRAVENOUS; SUBCUTANEOUS at 05:09

## 2021-01-01 RX ADMIN — Medication 1000 UNITS: at 11:09

## 2021-01-01 RX ADMIN — NOREPINEPHRINE BITARTRATE 0.09 MCG/KG/MIN: 1 INJECTION, SOLUTION, CONCENTRATE INTRAVENOUS at 12:09

## 2021-01-01 RX ADMIN — NOREPINEPHRINE BITARTRATE 0.24 MCG/KG/MIN: 1 INJECTION, SOLUTION, CONCENTRATE INTRAVENOUS at 03:10

## 2021-01-01 RX ADMIN — DEXTROSE MONOHYDRATE 25 G: 25 INJECTION, SOLUTION INTRAVENOUS at 10:09

## 2021-01-01 RX ADMIN — LORAZEPAM 1 MG: 2 INJECTION INTRAMUSCULAR at 03:09

## 2021-01-01 RX ADMIN — INSULIN ASPART 1 UNITS: 100 INJECTION, SOLUTION INTRAVENOUS; SUBCUTANEOUS at 01:10

## 2021-01-01 RX ADMIN — CISATRACURIUM BESYLATE 6.5 MCG/KG/MIN: 10 INJECTION, SOLUTION INTRAVENOUS at 09:10

## 2021-01-01 RX ADMIN — DEXAMETHASONE SODIUM PHOSPHATE 6 MG: 4 INJECTION INTRA-ARTICULAR; INTRALESIONAL; INTRAMUSCULAR; INTRAVENOUS; SOFT TISSUE at 10:09

## 2021-01-01 RX ADMIN — NOREPINEPHRINE BITARTRATE 0.02 MCG/KG/MIN: 1 INJECTION, SOLUTION, CONCENTRATE INTRAVENOUS at 07:10

## 2021-01-01 RX ADMIN — HYDROCORTISONE SODIUM SUCCINATE 50 MG: 100 INJECTION, POWDER, FOR SOLUTION INTRAMUSCULAR; INTRAVENOUS at 06:10

## 2021-01-01 RX ADMIN — SODIUM ZIRCONIUM CYCLOSILICATE 10 G: 10 POWDER, FOR SUSPENSION ORAL at 09:09

## 2021-01-01 RX ADMIN — FUROSEMIDE 120 MG: 10 INJECTION, SOLUTION INTRAMUSCULAR; INTRAVENOUS at 02:10

## 2021-01-01 RX ADMIN — POTASSIUM CHLORIDE 20 MEQ: 200 INJECTION, SOLUTION INTRAVENOUS at 02:10

## 2021-01-01 RX ADMIN — NICARDIPINE HYDROCHLORIDE 2.5 MG/HR: 0.2 INJECTION, SOLUTION INTRAVENOUS at 08:09

## 2021-01-01 RX ADMIN — FENTANYL CITRATE 275 MCG/HR: 50 INJECTION INTRAVENOUS at 03:09

## 2021-01-01 RX ADMIN — CEFEPIME 2 G: 2 INJECTION, POWDER, FOR SOLUTION INTRAVENOUS at 11:10

## 2021-01-01 RX ADMIN — ACETAMINOPHEN 650 MG: 325 TABLET ORAL at 11:09

## 2021-01-01 RX ADMIN — CISATRACURIUM BESYLATE 2 MCG/KG/MIN: 10 INJECTION, SOLUTION INTRAVENOUS at 06:09

## 2021-01-01 RX ADMIN — TACROLIMUS 2 MG: 1 CAPSULE ORAL at 10:09

## 2021-01-01 RX ADMIN — INSULIN DETEMIR 10 UNITS: 100 INJECTION, SOLUTION SUBCUTANEOUS at 03:09

## 2021-01-01 RX ADMIN — Medication 0.1 MCG/KG/MIN: at 04:09

## 2021-01-01 RX ADMIN — SODIUM CHLORIDE: 0.9 INJECTION, SOLUTION INTRAVENOUS at 11:09

## 2021-01-01 RX ADMIN — ENOXAPARIN SODIUM 40 MG: 40 INJECTION SUBCUTANEOUS at 09:09

## 2021-01-01 RX ADMIN — NOREPINEPHRINE BITARTRATE 0.09 MCG/KG/MIN: 1 INJECTION, SOLUTION, CONCENTRATE INTRAVENOUS at 05:09

## 2021-09-18 PROBLEM — Z94.4: Status: ACTIVE | Noted: 2021-01-01

## 2021-09-18 PROBLEM — I25.10 ARTERIOSCLEROSIS OF CORONARY ARTERY: Status: ACTIVE | Noted: 2021-01-01

## 2021-09-18 PROBLEM — U07.1 COVID-19: Status: ACTIVE | Noted: 2021-01-01

## 2021-09-18 PROBLEM — I10 ESSENTIAL HYPERTENSION: Status: ACTIVE | Noted: 2021-01-01

## 2021-09-18 PROBLEM — E11.9 TYPE 2 DIABETES MELLITUS WITHOUT COMPLICATION: Status: ACTIVE | Noted: 2021-01-01

## 2021-09-18 PROBLEM — N18.32 STAGE 3B CHRONIC KIDNEY DISEASE: Status: ACTIVE | Noted: 2021-01-01

## 2021-09-18 PROBLEM — R69 OTHER ILL-DEFINED AND UNKNOWN CAUSES OF MORBIDITY AND MORTALITY: Status: ACTIVE | Noted: 2021-01-01

## 2021-09-23 PROBLEM — N17.9 AKI (ACUTE KIDNEY INJURY): Status: ACTIVE | Noted: 2021-01-01

## 2021-09-23 PROBLEM — E87.5 HYPERKALEMIA: Status: ACTIVE | Noted: 2021-01-01

## 2021-09-26 PROBLEM — I48.91 ATRIAL FIBRILLATION WITH RAPID VENTRICULAR RESPONSE: Status: ACTIVE | Noted: 2021-01-01

## 2021-10-04 PROBLEM — E87.1 HYPONATREMIA: Status: ACTIVE | Noted: 2021-01-01

## 2021-10-04 PROBLEM — E87.5 HYPERKALEMIA: Status: RESOLVED | Noted: 2021-01-01 | Resolved: 2021-01-01

## 2021-10-07 PROBLEM — J93.9 PNEUMOTHORAX ON RIGHT: Status: ACTIVE | Noted: 2021-01-01

## 2021-10-10 PROBLEM — N18.32 STAGE 3B CHRONIC KIDNEY DISEASE: Status: RESOLVED | Noted: 2021-01-01 | Resolved: 2021-01-01

## 2021-10-10 PROBLEM — I10 ESSENTIAL HYPERTENSION: Status: RESOLVED | Noted: 2021-01-01 | Resolved: 2021-01-01

## 2021-10-10 PROBLEM — R57.9 SHOCK: Status: ACTIVE | Noted: 2021-01-01
